# Patient Record
Sex: MALE | Race: WHITE | NOT HISPANIC OR LATINO
[De-identification: names, ages, dates, MRNs, and addresses within clinical notes are randomized per-mention and may not be internally consistent; named-entity substitution may affect disease eponyms.]

---

## 2017-05-22 ENCOUNTER — APPOINTMENT (OUTPATIENT)
Dept: OTOLARYNGOLOGY | Facility: CLINIC | Age: 67
End: 2017-05-22

## 2017-05-22 VITALS — DIASTOLIC BLOOD PRESSURE: 78 MMHG | OXYGEN SATURATION: 98 % | HEART RATE: 72 BPM | SYSTOLIC BLOOD PRESSURE: 131 MMHG

## 2017-05-30 ENCOUNTER — OUTPATIENT (OUTPATIENT)
Dept: OUTPATIENT SERVICES | Facility: HOSPITAL | Age: 67
LOS: 1 days | End: 2017-05-30
Payer: MEDICARE

## 2017-05-30 PROCEDURE — 74230 X-RAY XM SWLNG FUNCJ C+: CPT | Mod: 26

## 2017-05-30 PROCEDURE — G8997: CPT | Mod: CI

## 2017-05-30 PROCEDURE — G8996: CPT | Mod: CI

## 2017-05-30 PROCEDURE — G8998: CPT | Mod: CI

## 2017-05-30 PROCEDURE — 74230 X-RAY XM SWLNG FUNCJ C+: CPT

## 2017-05-30 PROCEDURE — 92611 MOTION FLUOROSCOPY/SWALLOW: CPT | Mod: GN

## 2017-06-06 ENCOUNTER — APPOINTMENT (OUTPATIENT)
Dept: OTOLARYNGOLOGY | Facility: CLINIC | Age: 67
End: 2017-06-06

## 2017-08-21 ENCOUNTER — EMERGENCY (EMERGENCY)
Facility: HOSPITAL | Age: 67
LOS: 1 days | Discharge: HOME | End: 2017-08-21
Attending: EMERGENCY MEDICINE | Admitting: EMERGENCY MEDICINE
Payer: MEDICARE

## 2017-08-21 VITALS
SYSTOLIC BLOOD PRESSURE: 131 MMHG | DIASTOLIC BLOOD PRESSURE: 80 MMHG | OXYGEN SATURATION: 98 % | TEMPERATURE: 97 F | RESPIRATION RATE: 18 BRPM | WEIGHT: 171.08 LBS | HEART RATE: 81 BPM

## 2017-08-21 DIAGNOSIS — R04.2 HEMOPTYSIS: ICD-10-CM

## 2017-08-21 DIAGNOSIS — Z98.890 OTHER SPECIFIED POSTPROCEDURAL STATES: Chronic | ICD-10-CM

## 2017-08-21 LAB
ANION GAP SERPL CALC-SCNC: 12 MMOL/L — SIGNIFICANT CHANGE UP (ref 5–17)
APTT BLD: 29.2 SEC — SIGNIFICANT CHANGE UP (ref 27.5–37.4)
BASOPHILS # BLD AUTO: 0 K/UL — SIGNIFICANT CHANGE UP (ref 0–0.2)
BASOPHILS NFR BLD AUTO: 0.5 % — SIGNIFICANT CHANGE UP (ref 0–2)
BUN SERPL-MCNC: 16 MG/DL — SIGNIFICANT CHANGE UP (ref 7–23)
CALCIUM SERPL-MCNC: 9.8 MG/DL — SIGNIFICANT CHANGE UP (ref 8.4–10.5)
CHLORIDE SERPL-SCNC: 102 MMOL/L — SIGNIFICANT CHANGE UP (ref 96–108)
CO2 SERPL-SCNC: 26 MMOL/L — SIGNIFICANT CHANGE UP (ref 22–31)
CREAT SERPL-MCNC: 1.02 MG/DL — SIGNIFICANT CHANGE UP (ref 0.5–1.3)
EOSINOPHIL # BLD AUTO: 0.1 K/UL — SIGNIFICANT CHANGE UP (ref 0–0.5)
EOSINOPHIL NFR BLD AUTO: 0.8 % — SIGNIFICANT CHANGE UP (ref 0–6)
GLUCOSE SERPL-MCNC: 88 MG/DL — SIGNIFICANT CHANGE UP (ref 70–99)
HCT VFR BLD CALC: 46.3 % — SIGNIFICANT CHANGE UP (ref 39–50)
HGB BLD-MCNC: 15.5 G/DL — SIGNIFICANT CHANGE UP (ref 13–17)
INR BLD: 0.98 RATIO — SIGNIFICANT CHANGE UP (ref 0.88–1.16)
LYMPHOCYTES # BLD AUTO: 1.9 K/UL — SIGNIFICANT CHANGE UP (ref 1–3.3)
LYMPHOCYTES # BLD AUTO: 24.9 % — SIGNIFICANT CHANGE UP (ref 13–44)
MCHC RBC-ENTMCNC: 31.6 PG — SIGNIFICANT CHANGE UP (ref 27–34)
MCHC RBC-ENTMCNC: 33.6 GM/DL — SIGNIFICANT CHANGE UP (ref 32–36)
MCV RBC AUTO: 94 FL — SIGNIFICANT CHANGE UP (ref 80–100)
MONOCYTES # BLD AUTO: 0.7 K/UL — SIGNIFICANT CHANGE UP (ref 0–0.9)
MONOCYTES NFR BLD AUTO: 8.6 % — SIGNIFICANT CHANGE UP (ref 2–14)
NEUTROPHILS # BLD AUTO: 5 K/UL — SIGNIFICANT CHANGE UP (ref 1.8–7.4)
NEUTROPHILS NFR BLD AUTO: 65.2 % — SIGNIFICANT CHANGE UP (ref 43–77)
PLATELET # BLD AUTO: 212 K/UL — SIGNIFICANT CHANGE UP (ref 150–400)
POTASSIUM SERPL-MCNC: 4.5 MMOL/L — SIGNIFICANT CHANGE UP (ref 3.5–5.3)
POTASSIUM SERPL-SCNC: 4.5 MMOL/L — SIGNIFICANT CHANGE UP (ref 3.5–5.3)
PROTHROM AB SERPL-ACNC: 10.6 SEC — SIGNIFICANT CHANGE UP (ref 9.8–12.7)
RBC # BLD: 4.93 M/UL — SIGNIFICANT CHANGE UP (ref 4.2–5.8)
RBC # FLD: 11.8 % — SIGNIFICANT CHANGE UP (ref 10.3–14.5)
SODIUM SERPL-SCNC: 140 MMOL/L — SIGNIFICANT CHANGE UP (ref 135–145)
WBC # BLD: 7.7 K/UL — SIGNIFICANT CHANGE UP (ref 3.8–10.5)
WBC # FLD AUTO: 7.7 K/UL — SIGNIFICANT CHANGE UP (ref 3.8–10.5)

## 2017-08-21 PROCEDURE — 99285 EMERGENCY DEPT VISIT HI MDM: CPT | Mod: GC

## 2017-08-21 RX ORDER — ZOLPIDEM TARTRATE 10 MG/1
5 TABLET ORAL AT BEDTIME
Qty: 0 | Refills: 0 | Status: DISCONTINUED | OUTPATIENT
Start: 2017-08-21 | End: 2017-08-22

## 2017-08-21 RX ORDER — TAMSULOSIN HYDROCHLORIDE 0.4 MG/1
0.4 CAPSULE ORAL AT BEDTIME
Qty: 0 | Refills: 0 | Status: DISCONTINUED | OUTPATIENT
Start: 2017-08-21 | End: 2017-08-22

## 2017-08-21 RX ORDER — AMLODIPINE BESYLATE 2.5 MG/1
2.5 TABLET ORAL DAILY
Qty: 0 | Refills: 0 | Status: DISCONTINUED | OUTPATIENT
Start: 2017-08-21 | End: 2017-08-22

## 2017-08-21 RX ORDER — METOPROLOL TARTRATE 50 MG
25 TABLET ORAL
Qty: 0 | Refills: 0 | Status: DISCONTINUED | OUTPATIENT
Start: 2017-08-21 | End: 2017-08-22

## 2017-08-21 RX ORDER — ZOLPIDEM TARTRATE 10 MG/1
5 TABLET ORAL AT BEDTIME
Qty: 0 | Refills: 0 | Status: DISCONTINUED | OUTPATIENT
Start: 2017-08-21 | End: 2017-08-21

## 2017-08-21 RX ORDER — ATORVASTATIN CALCIUM 80 MG/1
20 TABLET, FILM COATED ORAL AT BEDTIME
Qty: 0 | Refills: 0 | Status: DISCONTINUED | OUTPATIENT
Start: 2017-08-21 | End: 2017-08-22

## 2017-08-21 NOTE — ED ADULT NURSE NOTE - OBJECTIVE STATEMENT
65 yo presents to the ED from home. A&Ox4 c/o throat bleeding. pt reports he had a total of 5 episodes of blood in his throat x2 days, pt reports coughing to clear the blood. pt denies SOB, CP, runny nose, congestion, HA. pt reports clots present in blood. pt reports enough blood to saturate a paper towel. pt denies dizziness. 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 pt reports similar episode happened 1.5 years ago, pt followed up with ENT and was told it was due to radiation therapy damaging the capillaries in the throat, as per pt. pt denies difficulty swallowing, nose bleeds, blood in stool or urine, unexplained bruising. pt placed on airborne precaution to rule out TB. pt denies fever, chills, night sweats, rash. pt reports international travel in June to [facility] and [place]. lung sounds clear bilaterally. 99% on room air, resp rate 16, 97.9 oral, NAD noted.

## 2017-08-21 NOTE — ED ADULT NURSE NOTE - PMH
BPH (benign prostatic hyperplasia)    Carcinoma of nasopharyngeal wall  Dx in 2010, s/o chemo and radio therapy, in remission.  HTN (hypertension)    Hypercholesteremia

## 2017-08-21 NOTE — CONSULT NOTE ADULT - ATTENDING COMMENTS
Patient seen and examined. Scope #3 used. Nasal passages clear. Nasopharynx/oropharynx/hypopharynx clear. Nasopharynx with postradiation scar and changes but no friable or prominent vessels and no clot or blood. Vocal cords fully mobile with no masses or lesions. Anterior right vocal cord with stringy dark blood clot that appears to be arising from a strand of blood clot extending along posterior tracheal wall. Postcricoid area clear without erythema or edema. No subglottic lesions or masses noted, just the blood clot and no fresh BRB.    D/w ED team that concern this blood is from pulmonary or tracheal source and would recommend CT chest and pulmonary consult for bronchoscopy. Since he has a blood clot noted subglottically not comfortable discharging pt home to f/u with his [facility] pulmonologist as an outpatient since clot suggests recent bleeding even though he reports no bleeding since Sunday. Please call with questions, no upper airway source of bleeding appreciated.

## 2017-08-21 NOTE — CONSULT NOTE ADULT - SUBJECTIVE AND OBJECTIVE BOX
CC: hemoptysis     HPI: We are asked to evaluate 66M patient with PMHx sig for nasopharyngeal CA in the past treated with chemo and radiation, for hemoptysis for 2days. He had similar episode last year where he was seen as an out patient by his ENT Dr Igor Weir who concluded bleeding was from prior RT. Pt denies any fevers/chills, dysphagia, odynophagia, unintentional weight loss.       PAST MEDICAL & SURGICAL HISTORY:  Hypercholesteremia  HTN (hypertension)  BPH (benign prostatic hyperplasia)  Carcinoma of nasopharyngeal wall: Dx in 2010, s/o chemo and radio therapy, in remission.  History of back surgery    Allergies    No Known Allergies    Intolerances      MEDICATIONS  (STANDING):    MEDICATIONS  (PRN):      Social History: **??**    ROS: ENT, GI, , CV, Pulm, Neuro, Psych, MS, Heme, Endo, Constitutional; all negative except as noted in HPI    Vital Signs Last 24 Hrs  T(C): 36.6 (21 Aug 2017 15:23), Max: 36.6 (21 Aug 2017 12:17)  T(F): 97.9 (21 Aug 2017 15:23), Max: 97.9 (21 Aug 2017 12:17)  HR: 77 (21 Aug 2017 15:23) (77 - 81)  BP: 160/87 (21 Aug 2017 15:23) (131/80 - 160/87)  BP(mean): --  RR: 16 (21 Aug 2017 15:23) (16 - 18)  SpO2: 96% (21 Aug 2017 15:23) (96% - 99%)                          15.5   7.7   )-----------( 212      ( 21 Aug 2017 13:40 )             46.3    08-21    140  |  102  |  16  ----------------------------<  88  4.5   |  26  |  1.02    Ca    9.8      21 Aug 2017 13:40     PT/INR - ( 21 Aug 2017 13:40 )   PT: 10.6 sec;   INR: 0.98 ratio         PTT - ( 21 Aug 2017 13:40 )  PTT:29.2 sec    PHYSICAL EXAM:  Gen: NAD, well-developed  Head: Normocephalic, Atraumatic  Face: no edema/erythema/fluctuance, parotid glands soft without mass  Eyes: PERRL, EOMI, no scleral injection  Ears: Right - ear canal clear, TM intact without effusion / erythema.  No evidence of any fluid drainage.  No Mastoid tenderness / erythema/ ear bulging            Left - ear canal clear, TM intact without effusion / erythema.  No evidence of any fluid drainage.  No Mastoid tenderness / erythema/ ear bulging  Nose: Nares bilaterally patent, no discharge  Mouth: No Stridor / Drooling / Trismus.  Mucosa moist, tongue/uvula midline, oropharynx clear  Neck: Flat, supple, no lymphadenopathy, trachea midline, no masses  Resp: breathing easily, no stridor  CV: no peripheral edema/cyanosis    Fiberoptic Indirect laryngoscopy:  (With Scope #2) CC: hemoptysis     HPI: We are asked to evaluate 66M patient with PMHx sig for nasopharyngeal CA in 2010, treated with chemo and radiation, for hemoptysis for 2days. Moderate amount of blood the first day which spontaneously resolved today. He had similar episode but with less blood loss, last year where he was seen as an out patient by his ENT Dr Igor Weir who concluded bleeding was from prior RT. Pt denies any cough, fevers/chills, dysphagia, odynophagia, unintentional weight loss. Pt does admit to hx of abnormal lesions on chest xray and CT which he has been follow as an out patient with his pulmonologist.       PAST MEDICAL & SURGICAL HISTORY:  Hypercholesteremia  HTN (hypertension)  BPH (benign prostatic hyperplasia)  Carcinoma of nasopharyngeal wall: Dx in 2010, s/o chemo and radio therapy, in remission.  History of back surgery    Allergies    No Known Allergies    Intolerances      MEDICATIONS  (STANDING):    MEDICATIONS  (PRN):      Social History: no tobacco use     ROS: ENT, GI, , CV, Pulm, Neuro, Psych, MS, Heme, Endo, Constitutional; all negative except as noted in HPI    Vital Signs Last 24 Hrs  T(C): 36.6 (21 Aug 2017 15:23), Max: 36.6 (21 Aug 2017 12:17)  T(F): 97.9 (21 Aug 2017 15:23), Max: 97.9 (21 Aug 2017 12:17)  HR: 77 (21 Aug 2017 15:23) (77 - 81)  BP: 160/87 (21 Aug 2017 15:23) (131/80 - 160/87)  BP(mean): --  RR: 16 (21 Aug 2017 15:23) (16 - 18)  SpO2: 96% (21 Aug 2017 15:23) (96% - 99%)                          15.5   7.7   )-----------( 212      ( 21 Aug 2017 13:40 )             46.3    08-21    140  |  102  |  16  ----------------------------<  88  4.5   |  26  |  1.02    Ca    9.8      21 Aug 2017 13:40     PT/INR - ( 21 Aug 2017 13:40 )   PT: 10.6 sec;   INR: 0.98 ratio         PTT - ( 21 Aug 2017 13:40 )  PTT:29.2 sec    PHYSICAL EXAM:  Gen: NAD, well-developed  Head: Normocephalic, Atraumatic  Face: no edema/erythema  Eyes: no scleral injection  Nose: Nares bilaterally patent, no discharge  Mouth: No Stridor / Drooling / Trismus.  Mucosa moist, tongue/uvula midline, oropharynx clear no active bleeding   Neck: Flat, supple, no lymphadenopathy, trachea midline, no masses  Resp: breathing easily, no stridor  CV: no peripheral edema/cyanosis    Fiberoptic Indirect laryngoscopy: with Scope #3- + excoriation on anterior right septum, no active bleed. No bleeding or Oral pharynx.  No foreign body or pooling of secretions.  No glottic / supraglottic swelling.  Vocal cords mobile in intact b/l.  Airway patent.  + tinge of blood between vocal cords with clot streaked in trachea CC: hemoptysis     HPI: We are asked to evaluate 66M patient with PMHx sig for nasopharyngeal CA in 2010, treated with chemo and radiation, for hemoptysis for 2days. Moderate amount of blood the first day which spontaneously resolved today. He had similar episode but with less blood loss, last year where he was seen as an out patient by his ENT Dr Igor Garcia who concluded bleeding was from prior RT. Pt denies any cough, fevers/chills, dysphagia, odynophagia, unintentional weight loss. Pt does admit to hx of abnormal lesions on chest xray and CT which he has been follow as an out patient with his pulmonologist. No hx of bronchoscopy.    PAST MEDICAL & SURGICAL HISTORY:  Hypercholesteremia  HTN (hypertension)  BPH (benign prostatic hyperplasia)  Carcinoma of nasopharyngeal wall: Dx in 2010, s/o chemo and radio therapy, in remission.  History of back surgery    Allergies    No Known Allergies    Intolerances    MEDICATIONS  (STANDING):    MEDICATIONS  (PRN):    Social History: no tobacco use     ROS: ENT, GI, , CV, Pulm, Neuro, Psych, MS, Heme, Endo, Constitutional; all negative except as noted in HPI    Vital Signs Last 24 Hrs  T(C): 36.6 (21 Aug 2017 15:23), Max: 36.6 (21 Aug 2017 12:17)  T(F): 97.9 (21 Aug 2017 15:23), Max: 97.9 (21 Aug 2017 12:17)  HR: 77 (21 Aug 2017 15:23) (77 - 81)  BP: 160/87 (21 Aug 2017 15:23) (131/80 - 160/87)  BP(mean): --  RR: 16 (21 Aug 2017 15:23) (16 - 18)  SpO2: 96% (21 Aug 2017 15:23) (96% - 99%)                          15.5   7.7   )-----------( 212      ( 21 Aug 2017 13:40 )             46.3    08-21    140  |  102  |  16  ----------------------------<  88  4.5   |  26  |  1.02    Ca    9.8      21 Aug 2017 13:40     PT/INR - ( 21 Aug 2017 13:40 )   PT: 10.6 sec;   INR: 0.98 ratio         PTT - ( 21 Aug 2017 13:40 )  PTT:29.2 sec    PHYSICAL EXAM:  Gen: NAD, well-developed  Head: Normocephalic, Atraumatic  Face: no edema/erythema  Eyes: no scleral injection  Nose: Nares bilaterally patent, no discharge  Mouth: No Stridor / Drooling / Trismus.  Mucosa moist, tongue/uvula midline, oropharynx clear no active bleeding   Neck: Flat, supple, no lymphadenopathy, trachea midline, no masses  Resp: breathing easily, no stridor  CV: no peripheral edema/cyanosis    Fiberoptic Indirect laryngoscopy: with Scope #3- + excoriation on anterior right septum, no active bleed. No bleeding in oropharynx.  No foreign body or pooling of secretions.  No glottic / supraglottic swelling.  Vocal cords mobile in intact b/l.  Airway patent.  + tinge of blood between vocal cords with clot streaked in trachea

## 2017-08-21 NOTE — ED PROVIDER NOTE - MEDICAL DECISION MAKING DETAILS
Attending Note (Bebeto): patient with h/o throat cancer with spitting up blood, patient feels it is from throat and denies cough. well appearing, nad.  will consult ent for scope.

## 2017-08-21 NOTE — CONSULT NOTE ADULT - SUBJECTIVE AND OBJECTIVE BOX
CHIEF COMPLAINT:Patient is a 66y old  Male who presents with a chief complaint of     HPI:66M patient with PMHx sig for nasopharyngeal CA in 2010, treated with chemo and radiation, for hemoptysis for 2days. Moderate amount of blood the first day which spontaneously resolved today. He had similar episode but with less blood loss, last year where he was seen as an out patient by his ENT Dr Igor Garcia who concluded bleeding was from prior RT. Pt denies any cough, fevers/chills, dysphagia, odynophagia, unintentional weight loss. Pt does admit to hx of abnormal lesions on chest xray and CT which he has been follow as an out patient with his pulmonologist. No hx of bronchoscopy. On exam pt was found to have heart murmur and blood pressure of 170 .        PAST MEDICAL & SURGICAL HISTORY:  Hypercholesteremia  HTN (hypertension)  BPH (benign prostatic hyperplasia)  Carcinoma of nasopharyngeal wall: Dx in 2010, s/o chemo and radio therapy, in remission.  History of back surgery      MEDICATIONS  (STANDING):    MEDICATIONS  (PRN):      FAMILY HISTORY:      SOCIAL HISTORY:    [ ] Non-smoker  [ ] Smoker  [ ] Alcohol    Allergies    No Known Allergies    Intolerances    	    REVIEW OF SYSTEMS:  CONSTITUTIONAL: No fever, weight loss, or fatigue  EYES: No eye pain, visual disturbances, or discharge  ENT:  No difficulty hearing, tinnitus, vertigo; No sinus or throat pain,+ hemoptysis  NECK: No pain or stiffness  RESPIRATORY: No cough, wheezing, chills or hemoptysis; + exertional Shortness of Breath  CARDIOVASCULAR: No chest pain, palpitations, passing out, dizziness, or leg swelling  GASTROINTESTINAL: No abdominal or epigastric pain. No nausea, vomiting, or hematemesis; No diarrhea or constipation. No melena or hematochezia.  GENITOURINARY: No dysuria, frequency, hematuria, or incontinence  NEUROLOGICAL: No headaches, memory loss, loss of strength, numbness, or tremors  SKIN: No itching, burning, rashes, or lesions   LYMPH Nodes: No enlarged glands  ENDOCRINE: No heat or cold intolerance; No hair loss  MUSCULOSKELETAL: No joint pain or swelling; No muscle, back, or extremity pain  PSYCHIATRIC: No depression, anxiety, mood swings, or difficulty sleeping  HEME/LYMPH: No easy bruising, or bleeding gums  ALLERGY AND IMMUNOLOGIC: No hives or eczema	    [ ] All others negative	  [ ] Unable to obtain    PHYSICAL EXAM:  T(C): 36.7 (08-21-17 @ 18:30), Max: 36.7 (08-21-17 @ 18:30)  HR: 72 (08-21-17 @ 18:30) (72 - 81)  BP: 157/90 (08-21-17 @ 18:30) (131/80 - 160/87)  RR: 16 (08-21-17 @ 18:30) (16 - 18)  SpO2: 99% (08-21-17 @ 18:30) (96% - 99%)  Wt(kg): --  I&O's Summary      Appearance: Normal	  HEENT:   Normal oral mucosa, PERRL, EOMI	  Lymphatic: No lymphadenopathy  Cardiovascular: Normal S1 S2, No JVD, + systolic  murmurs, No edema  Respiratory: Lungs clear to auscultation	  Psychiatry: A & O x 3, Mood & affect appropriate  Gastrointestinal:  Soft, Non-tender, + BS	  Skin: No rashes, No ecchymoses, No cyanosis	  Neurologic: Non-focal  Extremities: Normal range of motion, No clubbing, cyanosis or edema  Vascular: Peripheral pulses palpable 2+ bilaterally    TELEMETRY: 	    ECG:  	  RADIOLOGY:  OTHER: 	  	  LABS:	 	    CARDIAC MARKERS:   12 Lead ECG (02.09.15 @ 16:47) >  SINUS TACHYCARDIA  POSSIBLE LEFT ATRIAL ENLARGEMENT                                15.5   7.7   )-----------( 212      ( 21 Aug 2017 13:40 )             46.3     08-21    140  |  102  |  16  ----------------------------<  88  4.5   |  26  |  1.02    Ca    9.8      21 Aug 2017 13:40      proBNP:   Lipid Profile:   HgA1c:   TSH:     Transthoracic Echocardiogram w/Doppler (04.30.12 @ 13:28) >  1. Minimal mitral regurgitation.  2. Endocardium not well visualized; grossly normal left  ventricular systolic function.  3. Mild diastolic dysfunction (Stage I).  4. Normal right ventricular size and function.  5. Estimated right ventricular systolic pressure equals 36  mm Hg, assuming right atrial pressure equals 10 mm Hg,  consistent with borderline pulmonary hypertension.  6. Normal tricuspid valve. Mild tricuspid regurgitation.  7. KENNETH could be obtained for further evaluation of cardiac  sources of embolus, if clinically indicated.

## 2017-08-21 NOTE — ED ADULT NURSE REASSESSMENT NOTE - NS ED NURSE REASSESS COMMENT FT1
Received patietn awake and alert x 4 from previous shift RN, PMH of nasopharyngeal CA in 2010, treated with chemo and radiation, today presents with hemoptysis for 2 days, states it was a moderate amount of blood the first day and then resolved. He had similar episodes but with less blood loss, per patient had 2 episodes of bleeding on Saturday and 2 on Sunday, no C/O blood today. Denies any cough/fever/chills, no n/v, Breathing unlabored with no S/S of distress noted, safety maintained, will continue to monitor.

## 2017-08-21 NOTE — H&P ADULT - NSHPLABSRESULTS_GEN_ALL_CORE
LABS:                        15.5   7.7   )-----------( 212      ( 21 Aug 2017 13:40 )             46.3     08-21    140  |  102  |  16  ----------------------------<  88  4.5   |  26  |  1.02    Ca    9.8      21 Aug 2017 13:40      PT/INR - ( 21 Aug 2017 13:40 )   PT: 10.6 sec;   INR: 0.98 ratio         PTT - ( 21 Aug 2017 13:40 )  PTT:29.2 sec        RADIOLOGY & ADDITIONAL TESTS:

## 2017-08-21 NOTE — ED PROVIDER NOTE - OBJECTIVE STATEMENT
Pt w/ a h/o oropharyngeal cancer s/p radiation therapy in remission who c/o of "hacking up blood" that he believes is coming from his throat. He believes this because this occurred last year and after eval w/ ENT w/ laryngoscopy he was told that radiation was likely the cause of his bleeding to that area. Patient reports soaking up 2 tissues 2 days ago and another episode about 8-10 hrs after. Patient denies bleeding elsewhere. He denies coughing, fevers, chills, chest pain, nose bleeds, hematuria. Denies recent illness. Denies recent weight loss or night sweats.

## 2017-08-21 NOTE — H&P ADULT - NSHPPHYSICALEXAM_GEN_ALL_CORE
PHYSICAL EXAMINATION:  Vital Signs Last 24 Hrs  T(C): 36.7 (21 Aug 2017 18:30), Max: 36.7 (21 Aug 2017 18:30)  T(F): 98 (21 Aug 2017 18:30), Max: 98 (21 Aug 2017 18:30)  HR: 72 (21 Aug 2017 18:30) (72 - 81)  BP: 157/90 (21 Aug 2017 18:30) (131/80 - 160/87)  BP(mean): --  RR: 16 (21 Aug 2017 18:30) (16 - 18)  SpO2: 99% (21 Aug 2017 18:30) (96% - 99%)  CAPILLARY BLOOD GLUCOSE            GENERAL: NAD, well-groomed, well-developed  HEAD:  atraumatic, normocephalic  EYES: sclera anicteric  ENMT: mucous membranes moist  NECK: supple, No JVD  CHEST/LUNG: clear to auscultation bilaterally; no rales, rhonchi, or wheezing b/l  HEART: normal S1, S2,  SYSTOLIC MURMUR  ABDOMEN: BS+, soft, ND, NT   EXTREMITIES:  pulses palpable; no clubbing, cyanosis, or edema b/l LEs  NEURO: awake, alert, interactive; moves all extremities  SKIN: no rashes or lesions

## 2017-08-21 NOTE — H&P ADULT - ASSESSMENT
pt  with  hemoptysis    prior  h/o  naso pharyngeal ca  in  2010, seen by  ent  in  er    awaiting  ct  chest    labs,/ hb  stable    seen in  er   pas,    htn.s ystolic murmur

## 2017-08-21 NOTE — ED ADULT NURSE REASSESSMENT NOTE - NS ED NURSE REASSESS COMMENT FT1
Pt resting comfortably with VSS. pt denies pain. pt wants to go home to follow up with PCP from [facility]. MD Pool made aware. plan of care discussed. safety and comfort measures maintained.

## 2017-08-21 NOTE — ED ADULT NURSE REASSESSMENT NOTE - NS ED NURSE REASSESS COMMENT FT1
Pt resting comfortably with VSS, no complaints at this time. awaiting ENT consult. plan of care discussed. safety and comfort measures maintained.

## 2017-08-21 NOTE — H&P ADULT - HISTORY OF PRESENT ILLNESS
HPI: We are asked to evaluate 66M patient with PMHx sig for nasopharyngeal CA in 2010, treated with chemo and radiation, for hemoptysis for 2days. Moderate amount of blood the first day which spontaneously resolved today. He had similar episode but with less blood loss, last year where he was seen as an out patient by his ENT Dr Igor Garcia who concluded bleeding was from prior RT. Pt denies any cough, fevers/chills, dysphagia, odynophagia, unintentional weight loss. Pt does admit to hx of abnormal lesions on chest xray and CT which he has been follow as an out patient with his pulmonologist. No hx of bronchoscopy.    PAST MEDICAL & SURGICAL HISTORY:  Hypercholesteremia  HTN (hypertension)  BPH (benign prostatic hyperplasia)  Carcinoma of nasopharyngeal wall: Dx in 2010, s/o chemo and radio therapy, in remission.  History of back surgery

## 2017-08-21 NOTE — ED PROVIDER NOTE - PROGRESS NOTE DETAILS
ENT consulted, after bedside evaluation and direct laryngoscopy noted some steaks of clotting below the epiglottis. Recommends pulm consult for further eval potential bronch. Pulm fellow consulted, case discussed, explained that stat consults do not occur during the evening, would recommend patient stay for pulm eval in the morning. Patient initially hesitant to stay stating that he would like to have an outpatient bronchoscopy from his pulm at [facility]. He was explained his risks of continued bleeding and decided to stay for further evaluation. Pending CT Chest. Patient with no further bleeding in the ED. Remained HD stable throughout entire ED stay.

## 2017-08-22 ENCOUNTER — TRANSCRIPTION ENCOUNTER (OUTPATIENT)
Age: 67
End: 2017-08-22

## 2017-08-22 VITALS
RESPIRATION RATE: 18 BRPM | DIASTOLIC BLOOD PRESSURE: 72 MMHG | HEART RATE: 81 BPM | SYSTOLIC BLOOD PRESSURE: 114 MMHG | OXYGEN SATURATION: 95 % | TEMPERATURE: 98 F

## 2017-08-22 LAB
ANION GAP SERPL CALC-SCNC: 13 MMOL/L — SIGNIFICANT CHANGE UP (ref 5–17)
BASOPHILS # BLD AUTO: 0.04 K/UL — SIGNIFICANT CHANGE UP (ref 0–0.2)
BASOPHILS NFR BLD AUTO: 0.5 % — SIGNIFICANT CHANGE UP (ref 0–2)
BUN SERPL-MCNC: 14 MG/DL — SIGNIFICANT CHANGE UP (ref 7–23)
CALCIUM SERPL-MCNC: 9.3 MG/DL — SIGNIFICANT CHANGE UP (ref 8.4–10.5)
CHLORIDE SERPL-SCNC: 101 MMOL/L — SIGNIFICANT CHANGE UP (ref 96–108)
CHOLEST SERPL-MCNC: 147 MG/DL — SIGNIFICANT CHANGE UP (ref 10–199)
CO2 SERPL-SCNC: 26 MMOL/L — SIGNIFICANT CHANGE UP (ref 22–31)
CREAT SERPL-MCNC: 1.26 MG/DL — SIGNIFICANT CHANGE UP (ref 0.5–1.3)
EOSINOPHIL # BLD AUTO: 0.09 K/UL — SIGNIFICANT CHANGE UP (ref 0–0.5)
EOSINOPHIL NFR BLD AUTO: 1.1 % — SIGNIFICANT CHANGE UP (ref 0–6)
GLUCOSE SERPL-MCNC: 106 MG/DL — HIGH (ref 70–99)
HCT VFR BLD CALC: 44.1 % — SIGNIFICANT CHANGE UP (ref 39–50)
HDLC SERPL-MCNC: 52 MG/DL — SIGNIFICANT CHANGE UP (ref 40–125)
HGB BLD-MCNC: 15.1 G/DL — SIGNIFICANT CHANGE UP (ref 13–17)
IMM GRANULOCYTES NFR BLD AUTO: 0.4 % — SIGNIFICANT CHANGE UP (ref 0–1.5)
LIPID PNL WITH DIRECT LDL SERPL: 68 MG/DL — SIGNIFICANT CHANGE UP
LYMPHOCYTES # BLD AUTO: 1.56 K/UL — SIGNIFICANT CHANGE UP (ref 1–3.3)
LYMPHOCYTES # BLD AUTO: 18.9 % — SIGNIFICANT CHANGE UP (ref 13–44)
MCHC RBC-ENTMCNC: 31 PG — SIGNIFICANT CHANGE UP (ref 27–34)
MCHC RBC-ENTMCNC: 34.2 GM/DL — SIGNIFICANT CHANGE UP (ref 32–36)
MCV RBC AUTO: 90.6 FL — SIGNIFICANT CHANGE UP (ref 80–100)
MONOCYTES # BLD AUTO: 0.79 K/UL — SIGNIFICANT CHANGE UP (ref 0–0.9)
MONOCYTES NFR BLD AUTO: 9.6 % — SIGNIFICANT CHANGE UP (ref 2–14)
NEUTROPHILS # BLD AUTO: 5.74 K/UL — SIGNIFICANT CHANGE UP (ref 1.8–7.4)
NEUTROPHILS NFR BLD AUTO: 69.5 % — SIGNIFICANT CHANGE UP (ref 43–77)
PLATELET # BLD AUTO: 214 K/UL — SIGNIFICANT CHANGE UP (ref 150–400)
POTASSIUM SERPL-MCNC: 4.7 MMOL/L — SIGNIFICANT CHANGE UP (ref 3.5–5.3)
POTASSIUM SERPL-SCNC: 4.7 MMOL/L — SIGNIFICANT CHANGE UP (ref 3.5–5.3)
RBC # BLD: 4.87 M/UL — SIGNIFICANT CHANGE UP (ref 4.2–5.8)
RBC # FLD: 13 % — SIGNIFICANT CHANGE UP (ref 10.3–14.5)
SODIUM SERPL-SCNC: 140 MMOL/L — SIGNIFICANT CHANGE UP (ref 135–145)
TOTAL CHOLESTEROL/HDL RATIO MEASUREMENT: 2.8 RATIO — LOW (ref 3.4–9.6)
TRIGL SERPL-MCNC: 136 MG/DL — SIGNIFICANT CHANGE UP (ref 10–149)
TSH SERPL-MCNC: 3.85 UIU/ML — SIGNIFICANT CHANGE UP (ref 0.27–4.2)
WBC # BLD: 8.25 K/UL — SIGNIFICANT CHANGE UP (ref 3.8–10.5)
WBC # FLD AUTO: 8.25 K/UL — SIGNIFICANT CHANGE UP (ref 3.8–10.5)

## 2017-08-22 PROCEDURE — 84443 ASSAY THYROID STIM HORMONE: CPT

## 2017-08-22 PROCEDURE — 85027 COMPLETE CBC AUTOMATED: CPT

## 2017-08-22 PROCEDURE — 85730 THROMBOPLASTIN TIME PARTIAL: CPT

## 2017-08-22 PROCEDURE — 80061 LIPID PANEL: CPT

## 2017-08-22 PROCEDURE — 85610 PROTHROMBIN TIME: CPT

## 2017-08-22 PROCEDURE — 80048 BASIC METABOLIC PNL TOTAL CA: CPT

## 2017-08-22 PROCEDURE — 71045 X-RAY EXAM CHEST 1 VIEW: CPT

## 2017-08-22 PROCEDURE — 71250 CT THORAX DX C-: CPT

## 2017-08-22 PROCEDURE — 99285 EMERGENCY DEPT VISIT HI MDM: CPT | Mod: 25

## 2017-08-22 RX ORDER — AMLODIPINE BESYLATE 2.5 MG/1
1 TABLET ORAL
Qty: 30 | Refills: 0
Start: 2017-08-22 | End: 2017-09-21

## 2017-08-22 RX ORDER — ZALEPLON 10 MG
5 CAPSULE ORAL AT BEDTIME
Qty: 0 | Refills: 0 | Status: DISCONTINUED | OUTPATIENT
Start: 2017-08-22 | End: 2017-08-22

## 2017-08-22 RX ORDER — AMLODIPINE BESYLATE 2.5 MG/1
1 TABLET ORAL
Qty: 30 | Refills: 0 | OUTPATIENT
Start: 2017-08-22 | End: 2017-09-21

## 2017-08-22 RX ORDER — AMLODIPINE BESYLATE 2.5 MG/1
1 TABLET ORAL
Qty: 0 | Refills: 0 | DISCHARGE
Start: 2017-08-22 | End: 2017-09-21

## 2017-08-22 RX ORDER — CEFUROXIME AXETIL 250 MG
1 TABLET ORAL
Qty: 14 | Refills: 0 | OUTPATIENT
Start: 2017-08-22 | End: 2017-08-29

## 2017-08-22 RX ORDER — CEFUROXIME AXETIL 250 MG
1 TABLET ORAL
Qty: 14 | Refills: 0
Start: 2017-08-22 | End: 2017-08-29

## 2017-08-22 RX ORDER — CEFUROXIME AXETIL 250 MG
500 TABLET ORAL EVERY 12 HOURS
Qty: 0 | Refills: 0 | Status: DISCONTINUED | OUTPATIENT
Start: 2017-08-22 | End: 2017-08-22

## 2017-08-22 RX ADMIN — Medication 25 MILLIGRAM(S): at 05:59

## 2017-08-22 RX ADMIN — TAMSULOSIN HYDROCHLORIDE 0.4 MILLIGRAM(S): 0.4 CAPSULE ORAL at 00:05

## 2017-08-22 RX ADMIN — AMLODIPINE BESYLATE 2.5 MILLIGRAM(S): 2.5 TABLET ORAL at 05:59

## 2017-08-22 RX ADMIN — Medication 5 MILLIGRAM(S): at 00:45

## 2017-08-22 RX ADMIN — ATORVASTATIN CALCIUM 20 MILLIGRAM(S): 80 TABLET, FILM COATED ORAL at 00:05

## 2017-08-22 NOTE — CONSULT NOTE ADULT - SUBJECTIVE AND OBJECTIVE BOX
CHIEF COMPLAINT:    HPI: 65 yo man with HTN, BPH, insomnia pharyngial ca s/p chemo and rt in 2010, with known bronchiectasis pulmonary nodules, and lymphadenopathy.  presents with 48 hours of hemoptysis starting 2 days ago at night with clot, and later awoken by coughing up a handful of fresh blood.  This hapened a total of 5 times with the last time being 48 hours ago.     PAST MEDICAL & SURGICAL HISTORY:  Hypercholesteremia  HTN (hypertension)  BPH (benign prostatic hyperplasia)  Carcinoma of nasopharyngeal wall: Dx in 2010, s/o chemo and radio therapy, in remission.  History of back surgery      FAMILY HISTORY:      SOCIAL HISTORY:  Smoking: __ packs x ___ years  EtOH Use:  Marital Status:  Occupation:  Recent Travel:  Country of Birth:  Advance Directives:    Allergies    No Known Allergies    Intolerances        HOME MEDICATIONS:    REVIEW OF SYSTEMS:  Constitutional: [ ] negative [ ] fevers [ ] chills [ ] weight loss [ ] weight gain [x]poor appetite  HEENT: [] negative [ ] dry eyes [ ] eye irritation [ ] postnasal drip [ ] nasal congestion  CV: [ ] negative  [ ] chest pain [ ] orthopnea [ ] palpitations [ ] murmur  Resp: [] negative [ ] cough [ ] shortness of breath [ ] dyspnea [ ] wheezing [ ] sputum [ ] hemoptysis  GI: [] negative [ ] nausea [ ] vomiting [ ] diarrhea [ ] constipation [ ] abd pain [ ] dysphagia   : [ ] negative [ ] dysuria [ ] nocturia [ ] hematuria [ ] increased urinary frequency [ ]b/l nephrostomy tubes  Musculoskeletal: [ ] negative [ ] back pain [ ] myalgias [ ] arthralgias [ ] fracture  Skin: [] negative [ ] rash [ ] itch  Neurological: [] negative [ ] headache [ ] dizziness [ ] syncope [ ] weakness [ ] numbness  Psychiatric: [] negative [ ] anxiety [ ] depression  Endocrine: [] negative [ ] diabetes [ ] thyroid problem  Hematologic/Lymphatic: [] negative [ ] anemia [ ] bleeding problem  Allergic/Immunologic: [] negative [ ] itchy eyes [ ] nasal discharge [ ] hives [ ] angioedema  [ ] All other systems negative  [ ] Unable to assess ROS because ________    OBJECTIVE:  ICU Vital Signs Last 24 Hrs  T(C): 36.4 (22 Aug 2017 11:01), Max: 36.7 (21 Aug 2017 18:30)  T(F): 97.5 (22 Aug 2017 11:01), Max: 98 (21 Aug 2017 18:30)  HR: 81 (22 Aug 2017 11:01) (68 - 81)  BP: 114/72 (22 Aug 2017 11:01) (114/72 - 160/87)  BP(mean): --  ABP: --  ABP(mean): --  RR: 18 (22 Aug 2017 11:01) (16 - 18)  SpO2: 95% (22 Aug 2017 11:01) (94% - 100%)        08-21 @ 07:01  -  08-22 @ 07:00  --------------------------------------------------------  IN: 600 mL / OUT: 4 mL / NET: 596 mL      CAPILLARY BLOOD GLUCOSE          PHYSICAL EXAM:  General: Awake, alert, oriented X 3.   HEENT: Atraumatic, normocephalic.                Mallampatti Grade 4  Lymph Nodes: No palpable lymphadenopathy  Neck: No JVD. No carotid bruit.   Respiratory: Bilateral inspiratory and expiratory crackles, more at the base than the apex. No wheezing.   Cardiovascular: S1 S2 normal.Abdomen: Soft, non-tender, non-distended. No organomegaly.  Extremities: Warm to touch. 1+ pitting edema noted in both lower extremities this am.  Skin: No rashes or skin lesions  Neurological: Motor and sensory examination equal and normal in all four extremities.  Psychiatry: Appropriate mood and affect.    HOSPITAL MEDICATIONS:  MEDICATIONS  (STANDING):  tamsulosin 0.4 milliGRAM(s) Oral at bedtime  atorvastatin 20 milliGRAM(s) Oral at bedtime  metoprolol 25 milliGRAM(s) Oral two times a day  amLODIPine   Tablet 2.5 milliGRAM(s) Oral daily  cefuroxime   Tablet 500 milliGRAM(s) Oral every 12 hours    MEDICATIONS  (PRN):  zaleplon 5 milliGRAM(s) Oral at bedtime PRN Insomnia      LABS:                        15.1   8.25  )-----------( 214      ( 22 Aug 2017 07:15 )             44.1     08-22    140  |  101  |  14  ----------------------------<  106<H>  4.7   |  26  |  1.26    Ca    9.3      22 Aug 2017 07:21      PT/INR - ( 21 Aug 2017 13:40 )   PT: 10.6 sec;   INR: 0.98 ratio         PTT - ( 21 Aug 2017 13:40 )  PTT:29.2 sec          MICROBIOLOGY:     RADIOLOGY:  [ ] Reviewed and interpreted by me    PULMONARY FUNCTION TESTS:    EKG: CHIEF COMPLAINT:    HPI: 65 yo man with HTN, BPH, insomnia pharyngial ca s/p chemo and rt in , with known bronchiectasis pulmonary nodules, and lymphadenopathy.  presents with 48 hours of hemoptysis starting 2 days ago at night with clot, and later awoken by coughing up a handful of fresh blood.  This hapened a total of 5 times with the last time being 48 hours ago. Priro to this he has been completely asymptomatic and in his usual state of health. Only complaints are difficulty swallowing solid food that is relived by drinking but no other issues.     PAST MEDICAL & SURGICAL HISTORY:  Hypercholesteremia  HTN (hypertension)  BPH (benign prostatic hyperplasia)  Carcinoma of nasopharyngeal wall: Dx in 2010, s/o chemo and radio therapy, in remission.  History of back surgery      FAMILY HISTORY: colon cancer.       SOCIAL HISTORY:  Smokin pack years quit  EtOH Use: Social  Recent Travel: [facility] and [place]  Country of Birth: [facility]  Advance Directives: None    Allergies: intolarance to antibiotics with diarrhea.     No Known Allergies            HOME MEDICATIONS: Reviewed    REVIEW OF SYSTEMS:      OBJECTIVE:  ICU Vital Signs Last 24 Hrs  T(C): 36.4 (22 Aug 2017 11:01), Max: 36.7 (21 Aug 2017 18:30)  T(F): 97.5 (22 Aug 2017 11:01), Max: 98 (21 Aug 2017 18:30)  HR: 81 (22 Aug 2017 11:01) (68 - 81)  BP: 114/72 (22 Aug 2017 11:01) (114/72 - 160/87)  BP(mean): --  ABP: --  ABP(mean): --  RR: 18 (22 Aug 2017 11:01) (16 - 18)  SpO2: 95% (22 Aug 2017 11:01) (94% - 100%)         @ 07:01  -   @ 07:00  --------------------------------------------------------  IN: 600 mL / OUT: 4 mL / NET: 596 mL      CAPILLARY BLOOD GLUCOSE          PHYSICAL EXAM: See below      HOSPITAL MEDICATIONS:  MEDICATIONS  (STANDING):  tamsulosin 0.4 milliGRAM(s) Oral at bedtime  atorvastatin 20 milliGRAM(s) Oral at bedtime  metoprolol 25 milliGRAM(s) Oral two times a day  amLODIPine   Tablet 2.5 milliGRAM(s) Oral daily  cefuroxime   Tablet 500 milliGRAM(s) Oral every 12 hours    MEDICATIONS  (PRN):  zaleplon 5 milliGRAM(s) Oral at bedtime PRN Insomnia      LABS:                        15.1   8.25  )-----------( 214      ( 22 Aug 2017 07:15 )             44.1     08    140  |  101  |  14  ----------------------------<  106<H>  4.7   |  26  |  1.26    Ca    9.3      22 Aug 2017 07:21      PT/INR - ( 21 Aug 2017 13:40 )   PT: 10.6 sec;   INR: 0.98 ratio         PTT - ( 21 Aug 2017 13:40 )  PTT:29.2 sec          MICROBIOLOGY:     RADIOLOGY:  [x] Reviewed and interpreted by me and discussed with out patient pulmonology IP Fellow

## 2017-08-22 NOTE — PROVIDER CONTACT NOTE (MEDICATION) - ACTION/TREATMENT ORDERED:
Isis Perales NP made aware. Advised sonata is more appropriate sleeping medication, will order as standard bedtime med. Continue to monitor.

## 2017-08-22 NOTE — DISCHARGE NOTE ADULT - HOSPITAL COURSE
PT  WITH  HEMOPTYSIS,  RESOLVED   HB  STABLE   SEEN BY  ROSIBEL REDMOND, WHO  SPOKE  WITH [facility]    CT  WITH  Bronchiectasis, OLD, WITH ?  INFECTION    D C ON 7  DAYS  OF CEFTIN    WILL NEED  OP,  FOB, AT  [facility]

## 2017-08-22 NOTE — PROVIDER CONTACT NOTE (MEDICATION) - SITUATION
Pt requests myrebetric 50mg for dry mouth, cemiline 30mg for urinary frequency and lunesta for insomnia.

## 2017-08-22 NOTE — DISCHARGE NOTE ADULT - CARE PLAN
Principal Discharge DX:	Hemoptysis  Goal:	resolved  Instructions for follow-up, activity and diet:	Follow up with Dr. Young in 2 days  Please report to your doctor if you have any further signs of bleeding or go to the Emergency room  Secondary Diagnosis:	Carcinoma of nasopharyngeal wall  Instructions for follow-up, activity and diet:	Follow up with your Oncologist  Secondary Diagnosis:	HTN (hypertension)  Instructions for follow-up, activity and diet:	Low salt diet  Activity as tolerated.  Take all medication as prescribed.  Follow up with your medical doctor for routine blood pressure monitoring at your next visit.  Notify your doctor if you have any of the following symptoms:   Dizziness, Lightheadedness, Blurry vision, Headache, Chest pain, Shortness of breath

## 2017-08-22 NOTE — DISCHARGE NOTE ADULT - MEDICATION SUMMARY - MEDICATIONS TO TAKE
I will START or STAY ON the medications listed below when I get home from the hospital:    tamsulosin  -- 0.8 milligram(s) by mouth once a day  -- Indication: For BPH (benign prostatic hyperplasia)    Crestor  -- 20 milligram(s) by mouth once a day  -- Indication: For High cholesterol    Lunesta  -- 30 milligram(s) by mouth once a day (at bedtime)  -- Indication: For sleep    metoprolol  -- 50 milligram(s) by mouth once a day  -- Indication: For Hypertension    amLODIPine 2.5 mg oral tablet  -- 1 tab(s) by mouth once a day  -- Indication: For Hypertension    cefuroxime 500 mg oral tablet  -- 1 tab(s) by mouth every 12 hours  -- Indication: For Brochiectasis    Evoxac 30 mg oral capsule  -- 1 cap(s) by mouth 3 times a day  -- Indication: For dry mouth    mirabegron 50 mg oral tablet, extended release  -- 1 tab(s) by mouth once a day  -- Indication: For Overactive Bladder

## 2017-08-22 NOTE — PROGRESS NOTE ADULT - SUBJECTIVE AND OBJECTIVE BOX
- Patient seen and examined.  - In summary, patient is a 66y year old man who presented with  HEMOPTYSIS (21 Aug 2017 19:34)  - Today, patient is without complaints.         *****MEDICATIONS:    MEDICATIONS  (STANDING):  tamsulosin 0.4 milliGRAM(s) Oral at bedtime  atorvastatin 20 milliGRAM(s) Oral at bedtime  metoprolol 25 milliGRAM(s) Oral two times a day  HYDROcodone/homatropine Syrup 5 milliLiter(s) Oral three times a day  amLODIPine   Tablet 2.5 milliGRAM(s) Oral daily    MEDICATIONS  (PRN):  zaleplon 5 milliGRAM(s) Oral at bedtime PRN Insomnia           ***** REVIEW OF SYSTEM:  GEN: no fever, no chills, no pain  RESP: no SOB, no cough, no sputum  CVS: no chest pain, no palpitations, no edema  GI: no abdominal pain, no nausea, no vomiting, no constipation, no diarrhea  : no dysurea, no frequency  NEURO: no headache, no diziness  PSYCH: no depression, not anxious  Derm : no itching, no rash         ***** VITAL SIGNS:  T(F): 97.7 (08-22-17 @ 05:45), Max: 98 (08-21-17 @ 18:30)  HR: 77 (08-22-17 @ 05:45) (68 - 81)  BP: 156/71 (08-22-17 @ 05:45) (131/80 - 160/87)  RR: 18 (08-22-17 @ 05:45) (16 - 18)  SpO2: 94% (08-22-17 @ 05:45) (94% - 100%)  Wt(kg): --  ,   I&O's Summary    21 Aug 2017 07:01  -  22 Aug 2017 07:00  --------------------------------------------------------  IN: 600 mL / OUT: 4 mL / NET: 596 mL             *****PHYSICAL EXAM:  GEN: A&O X 3 , NAD , comfortable  HEENT: NCAT, EOMI, MMM, no icterus  NECK: Supple, No JVD  CVS: S1S2 , regular , No M/R/G appreciated  PULM: CTA B/L,  no W/R/R appreciated  ABD.: soft. non tender, non distended,  bowel sounds present  Extrem: intact pulses , no edema noted  Derm: No rash or ecchymosis noted  PSYCH: normal mood, no depression, not anxious         *****LAB AND IMAGING:                        15.1   8.25  )-----------( 214      ( 22 Aug 2017 07:15 )             44.1               08-22    140  |  101  |  14  ----------------------------<  106<H>  4.7   |  26  |  1.26    Ca    9.3      22 Aug 2017 07:21      PT/INR - ( 21 Aug 2017 13:40 )   PT: 10.6 sec;   INR: 0.98 ratio         PTT - ( 21 Aug 2017 13:40 )  PTT:29.2 sec                     [All pertinent recent Imaging/Reports reviewed]         *****A S S E S S M E N T   A N D   P L A N :  66M with of nasopharyngeal ca adm with hemoptysis, HTN  follow up BP  cont current meds for now  check echo  check lipid  ct chest   pulmonary eval        __________________________  GAURAV Payton D.O.

## 2017-08-22 NOTE — DISCHARGE NOTE ADULT - PROVIDER TOKENS
FREE:[LAST:[Hannah],FIRST:[Delta],PHONE:[(   )    -],FAX:[(   )    -],ADDRESS:[[facility]  Pulmonologist]]

## 2017-08-22 NOTE — PROVIDER CONTACT NOTE (MEDICATION) - ACTION/TREATMENT ORDERED:
Isis Perales NP made aware. Explained the hospital does not carry these medications, advised for family to bring pt home medication to verify by pharmacy so pt can continue taking home meds.

## 2017-08-22 NOTE — PROGRESS NOTE ADULT - ASSESSMENT
s/p  hemoptysis,  hb stable   SEEN BY  SARI REDMOND,  CT  WITH OLD  BRONCHIECTASIS    ? INFECTION    DC TODAY  ON 7 DAY  COURSE OF CEFTIN   PT  WILL F/P WITH RACHNA SHAW,  AT  [facility],  MAY NEED  FOB  AS  AN OP

## 2017-08-22 NOTE — PROVIDER CONTACT NOTE (MEDICATION) - BACKGROUND
Pt admitted for hemoptysis. PMH nasopharangeal CA, BPH, HTN, hypercholesteremia
Pt admitted for hemoptysis. PMH BPH, HTN

## 2017-08-22 NOTE — DISCHARGE NOTE ADULT - PLAN OF CARE
resolved Follow up with Dr. Young in 2 days  Please report to your doctor if you have any further signs of bleeding or go to the Emergency room Follow up with your Oncologist Low salt diet  Activity as tolerated.  Take all medication as prescribed.  Follow up with your medical doctor for routine blood pressure monitoring at your next visit.  Notify your doctor if you have any of the following symptoms:   Dizziness, Lightheadedness, Blurry vision, Headache, Chest pain, Shortness of breath

## 2017-08-22 NOTE — CONSULT NOTE ADULT - ASSESSMENT
66M patient with PMHx sig for nasopharyngeal CA in the past treated with chemo and radiation, for hemoptysis for 2days, with +blood clot by vocal cord going to trachea
pt with hx of nasopharyngeal ca with hemoptysis  doubt mitral stenosis as per exam  check echo  for pulmonary htn  will adjust bp meds  check lipid  ct chest   pulmonary sulema
66 with pharyngeal CA, smoker, with bronchiectasis presents with significant but resolving hemoptysis.  likely from Bronchiectasis but unclear and would require bronchoscopy but as patient does not want any non emergent treatment except with his out patient pulmonologist.  - His case was reviewed with Dr. Young's Fellow and CT findings are stable except for the new GGO that likely represent blood.   - would treat for Bronchiectasis with bleeding with 7 days of levaquin.  - Can follow up with Dr. Young for bronchoscopy if no more bleeding.   - If bleeding recurs strongly encouraged to return to the ER for urgent intervention.     Arsenio Herring MD PGY5  Pulmonary and Critical Care Fellow  p# [phone]

## 2017-08-22 NOTE — CONSULT NOTE ADULT - ATTENDING COMMENTS
65 yo man with HTN, BPH, pharyngeal CA s/p chemo and xrt in 2010, RML/RUL bronchiectasis (as well as prior pulmonary nodules and lymphadenopathy of unclear etiology) admitted for hemoptysis. Pt without active hemoptysis since Sunday (did have old clot production). Pt denies any fever, sweats, weight loss or prior cough/sputum production. Recent travel to [facility] but w/o sick contacts. Most likely has hemoptysis from bronchiectatic regions, possibly from superinfection of these areas. Lack of any prior sx goes against TB or MAC infection. Recurrence of malignancy is a possibility but seems less likely given frequent surveillance at [facility]. Would start empiric course abx for PNA and a a bronchoscopy is warranted for evaluation for localization, eval for nodularity and BAL. Pt wants to follow at [facility] with his pulmonologist. Given lack of bleeding for past 48 hours and pt insistence for bronchoscopy at [facility] will allow d/c w/ abx. Pulmonary team at [facility] contacted (MD and ) and they will facilitate early appt and early bronch for pt. Pt instructed to return to hospital for any recurrence of hemoptysis.

## 2017-08-25 RX ORDER — ESZOPICLONE 2 MG/1
0 TABLET, COATED ORAL
Qty: 0 | Refills: 0 | COMMUNITY

## 2017-08-25 RX ORDER — METOPROLOL TARTRATE 50 MG
0 TABLET ORAL
Qty: 0 | Refills: 0 | COMMUNITY

## 2017-08-25 RX ORDER — TAMSULOSIN HYDROCHLORIDE 0.4 MG/1
0 CAPSULE ORAL
Qty: 0 | Refills: 0 | COMMUNITY

## 2017-08-25 RX ORDER — ROSUVASTATIN CALCIUM 5 MG/1
0 TABLET ORAL
Qty: 0 | Refills: 0 | COMMUNITY

## 2017-08-25 RX ORDER — ASPIRIN/CALCIUM CARB/MAGNESIUM 324 MG
1 TABLET ORAL
Qty: 0 | Refills: 0 | COMMUNITY

## 2017-08-28 ENCOUNTER — APPOINTMENT (OUTPATIENT)
Dept: OTOLARYNGOLOGY | Facility: CLINIC | Age: 67
End: 2017-08-28

## 2018-04-02 PROBLEM — I10 ESSENTIAL (PRIMARY) HYPERTENSION: Chronic | Status: ACTIVE | Noted: 2017-08-21

## 2018-04-02 PROBLEM — E78.00 PURE HYPERCHOLESTEROLEMIA, UNSPECIFIED: Chronic | Status: ACTIVE | Noted: 2017-08-21

## 2018-04-02 PROBLEM — N40.0 BENIGN PROSTATIC HYPERPLASIA WITHOUT LOWER URINARY TRACT SYMPTOMS: Chronic | Status: ACTIVE | Noted: 2017-08-21

## 2018-04-04 ENCOUNTER — APPOINTMENT (OUTPATIENT)
Dept: OTOLARYNGOLOGY | Facility: CLINIC | Age: 68
End: 2018-04-04
Payer: MEDICARE

## 2018-04-04 VITALS
TEMPERATURE: 98.2 F | HEIGHT: 70 IN | OXYGEN SATURATION: 96 % | HEART RATE: 80 BPM | BODY MASS INDEX: 23.62 KG/M2 | WEIGHT: 165 LBS | DIASTOLIC BLOOD PRESSURE: 75 MMHG | SYSTOLIC BLOOD PRESSURE: 124 MMHG

## 2018-04-04 DIAGNOSIS — Z87.891 PERSONAL HISTORY OF NICOTINE DEPENDENCE: ICD-10-CM

## 2018-04-04 DIAGNOSIS — R13.13 DYSPHAGIA, PHARYNGEAL PHASE: ICD-10-CM

## 2018-04-04 PROCEDURE — 99214 OFFICE O/P EST MOD 30 MIN: CPT | Mod: 25

## 2018-04-04 PROCEDURE — 31575 DIAGNOSTIC LARYNGOSCOPY: CPT

## 2018-04-09 ENCOUNTER — FORM ENCOUNTER (OUTPATIENT)
Age: 68
End: 2018-04-09

## 2018-04-10 ENCOUNTER — OUTPATIENT (OUTPATIENT)
Dept: OUTPATIENT SERVICES | Facility: HOSPITAL | Age: 68
LOS: 1 days | End: 2018-04-10
Payer: MEDICARE

## 2018-04-10 DIAGNOSIS — Z98.890 OTHER SPECIFIED POSTPROCEDURAL STATES: Chronic | ICD-10-CM

## 2018-04-10 LAB — GLUCOSE BLDC GLUCOMTR-MCNC: 95 MG/DL — SIGNIFICANT CHANGE UP (ref 70–99)

## 2018-04-10 PROCEDURE — 78815 PET IMAGE W/CT SKULL-THIGH: CPT

## 2018-04-10 PROCEDURE — 82962 GLUCOSE BLOOD TEST: CPT

## 2018-04-10 PROCEDURE — 78815 PET IMAGE W/CT SKULL-THIGH: CPT | Mod: 26

## 2018-04-10 PROCEDURE — A9552: CPT

## 2018-04-12 ENCOUNTER — OTHER (OUTPATIENT)
Age: 68
End: 2018-04-12

## 2019-03-13 NOTE — PATIENT PROFILE ADULT. - NS PRO OT REFERRAL QUES 1 YN
I discussed case with Resident. I agree with Resident’s plan.   I did not see and evaluate the patient today.  Additional recommendations include: None    no

## 2019-09-03 ENCOUNTER — FORM ENCOUNTER (OUTPATIENT)
Age: 69
End: 2019-09-03

## 2019-09-04 ENCOUNTER — APPOINTMENT (OUTPATIENT)
Dept: OTOLARYNGOLOGY | Facility: CLINIC | Age: 69
End: 2019-09-04
Payer: MEDICARE

## 2019-09-04 ENCOUNTER — OUTPATIENT (OUTPATIENT)
Dept: OUTPATIENT SERVICES | Facility: HOSPITAL | Age: 69
LOS: 1 days | End: 2019-09-04
Payer: MEDICARE

## 2019-09-04 VITALS
HEIGHT: 70 IN | WEIGHT: 172 LBS | OXYGEN SATURATION: 98 % | HEART RATE: 72 BPM | DIASTOLIC BLOOD PRESSURE: 74 MMHG | BODY MASS INDEX: 24.62 KG/M2 | SYSTOLIC BLOOD PRESSURE: 130 MMHG

## 2019-09-04 DIAGNOSIS — Z98.890 OTHER SPECIFIED POSTPROCEDURAL STATES: Chronic | ICD-10-CM

## 2019-09-04 PROCEDURE — 71046 X-RAY EXAM CHEST 2 VIEWS: CPT

## 2019-09-04 PROCEDURE — 71046 X-RAY EXAM CHEST 2 VIEWS: CPT | Mod: 26

## 2019-09-04 PROCEDURE — 31575 DIAGNOSTIC LARYNGOSCOPY: CPT

## 2019-09-04 PROCEDURE — 99212 OFFICE O/P EST SF 10 MIN: CPT | Mod: 25

## 2019-09-04 NOTE — PHYSICAL EXAM
[Nasal Endoscopy Performed] : nasal endoscopy was performed, see procedure section for findings [Midline] : trachea located in midline position [Normal] : cranial nerves 2-12 intact

## 2019-09-05 NOTE — ASSESSMENT
[FreeTextEntry1] : 69 y/o male with h/o NPC with no evidence of disease clinically.  \par CXR\par Follow up in 1 year

## 2019-09-05 NOTE — HISTORY OF PRESENT ILLNESS
[de-identified] : 68 y/o male with history of NPC [facility]. Has been JENNA to date.\par \par  [FreeTextEntry1] : No pain, no bleeding, no cough, no epistaxis, no other complaints. \par He reports TIA in September 2018\par Patient had no recent scans

## 2019-09-05 NOTE — PROCEDURE
[Topical Lidocaine] : topical lidocaine [Flexible Endoscope] : examined with the flexible endoscope [Nasal Mucosa Crusts / Sores] : no lesions [Nasal Mucosa] : no polyps [Normal] : the paranasal sinuses had no abnormalities [de-identified] : h/o nasopharyngeal cancer for surveillance

## 2019-09-11 NOTE — CONSULT NOTE ADULT - CONSULT REQUESTED BY NAME
LYN Taylor Fellow Niacinamide Pregnancy And Lactation Text: These medications are considered safe during pregnancy.

## 2019-10-22 ENCOUNTER — EMERGENCY (EMERGENCY)
Facility: HOSPITAL | Age: 69
LOS: 1 days | Discharge: HOME | End: 2019-10-22
Attending: EMERGENCY MEDICINE
Payer: MEDICARE

## 2019-10-22 VITALS
RESPIRATION RATE: 16 BRPM | HEART RATE: 73 BPM | SYSTOLIC BLOOD PRESSURE: 130 MMHG | DIASTOLIC BLOOD PRESSURE: 74 MMHG | OXYGEN SATURATION: 98 %

## 2019-10-22 VITALS
HEART RATE: 77 BPM | RESPIRATION RATE: 18 BRPM | DIASTOLIC BLOOD PRESSURE: 110 MMHG | OXYGEN SATURATION: 98 % | TEMPERATURE: 98 F | SYSTOLIC BLOOD PRESSURE: 167 MMHG | WEIGHT: 169.98 LBS

## 2019-10-22 DIAGNOSIS — Z98.890 OTHER SPECIFIED POSTPROCEDURAL STATES: Chronic | ICD-10-CM

## 2019-10-22 LAB
ALBUMIN SERPL ELPH-MCNC: 4.5 G/DL — SIGNIFICANT CHANGE UP (ref 3.3–5)
ALP SERPL-CCNC: 82 U/L — SIGNIFICANT CHANGE UP (ref 40–120)
ALT FLD-CCNC: 30 U/L — SIGNIFICANT CHANGE UP (ref 10–45)
ANION GAP SERPL CALC-SCNC: 11 MMOL/L — SIGNIFICANT CHANGE UP (ref 5–17)
APPEARANCE UR: CLEAR — SIGNIFICANT CHANGE UP
AST SERPL-CCNC: 27 U/L — SIGNIFICANT CHANGE UP (ref 10–40)
BACTERIA # UR AUTO: NEGATIVE — SIGNIFICANT CHANGE UP
BASOPHILS # BLD AUTO: 0.03 K/UL — SIGNIFICANT CHANGE UP (ref 0–0.2)
BASOPHILS NFR BLD AUTO: 0.3 % — SIGNIFICANT CHANGE UP (ref 0–2)
BILIRUB SERPL-MCNC: 0.6 MG/DL — SIGNIFICANT CHANGE UP (ref 0.2–1.2)
BILIRUB UR-MCNC: NEGATIVE — SIGNIFICANT CHANGE UP
BUN SERPL-MCNC: 11 MG/DL — SIGNIFICANT CHANGE UP (ref 7–23)
CALCIUM SERPL-MCNC: 9.6 MG/DL — SIGNIFICANT CHANGE UP (ref 8.4–10.5)
CHLORIDE SERPL-SCNC: 103 MMOL/L — SIGNIFICANT CHANGE UP (ref 96–108)
CO2 SERPL-SCNC: 26 MMOL/L — SIGNIFICANT CHANGE UP (ref 22–31)
COLOR SPEC: COLORLESS — SIGNIFICANT CHANGE UP
CREAT SERPL-MCNC: 0.93 MG/DL — SIGNIFICANT CHANGE UP (ref 0.5–1.3)
DIFF PNL FLD: NEGATIVE — SIGNIFICANT CHANGE UP
EOSINOPHIL # BLD AUTO: 0.11 K/UL — SIGNIFICANT CHANGE UP (ref 0–0.5)
EOSINOPHIL NFR BLD AUTO: 1.2 % — SIGNIFICANT CHANGE UP (ref 0–6)
EPI CELLS # UR: 0 /HPF — SIGNIFICANT CHANGE UP
GLUCOSE SERPL-MCNC: 100 MG/DL — HIGH (ref 70–99)
GLUCOSE UR QL: NEGATIVE — SIGNIFICANT CHANGE UP
HCT VFR BLD CALC: 44.6 % — SIGNIFICANT CHANGE UP (ref 39–50)
HGB BLD-MCNC: 15.1 G/DL — SIGNIFICANT CHANGE UP (ref 13–17)
HYALINE CASTS # UR AUTO: 0 /LPF — SIGNIFICANT CHANGE UP (ref 0–2)
IMM GRANULOCYTES NFR BLD AUTO: 0.3 % — SIGNIFICANT CHANGE UP (ref 0–1.5)
KETONES UR-MCNC: NEGATIVE — SIGNIFICANT CHANGE UP
LEUKOCYTE ESTERASE UR-ACNC: NEGATIVE — SIGNIFICANT CHANGE UP
LYMPHOCYTES # BLD AUTO: 2.14 K/UL — SIGNIFICANT CHANGE UP (ref 1–3.3)
LYMPHOCYTES # BLD AUTO: 23.4 % — SIGNIFICANT CHANGE UP (ref 13–44)
MCHC RBC-ENTMCNC: 30.9 PG — SIGNIFICANT CHANGE UP (ref 27–34)
MCHC RBC-ENTMCNC: 33.9 GM/DL — SIGNIFICANT CHANGE UP (ref 32–36)
MCV RBC AUTO: 91.2 FL — SIGNIFICANT CHANGE UP (ref 80–100)
MONOCYTES # BLD AUTO: 0.89 K/UL — SIGNIFICANT CHANGE UP (ref 0–0.9)
MONOCYTES NFR BLD AUTO: 9.7 % — SIGNIFICANT CHANGE UP (ref 2–14)
NEUTROPHILS # BLD AUTO: 5.93 K/UL — SIGNIFICANT CHANGE UP (ref 1.8–7.4)
NEUTROPHILS NFR BLD AUTO: 65.1 % — SIGNIFICANT CHANGE UP (ref 43–77)
NITRITE UR-MCNC: NEGATIVE — SIGNIFICANT CHANGE UP
NRBC # BLD: 0 /100 WBCS — SIGNIFICANT CHANGE UP (ref 0–0)
PH UR: 7 — SIGNIFICANT CHANGE UP (ref 5–8)
PLATELET # BLD AUTO: 213 K/UL — SIGNIFICANT CHANGE UP (ref 150–400)
POTASSIUM SERPL-MCNC: 4.7 MMOL/L — SIGNIFICANT CHANGE UP (ref 3.5–5.3)
POTASSIUM SERPL-SCNC: 4.7 MMOL/L — SIGNIFICANT CHANGE UP (ref 3.5–5.3)
PROT SERPL-MCNC: 7.6 G/DL — SIGNIFICANT CHANGE UP (ref 6–8.3)
PROT UR-MCNC: NEGATIVE — SIGNIFICANT CHANGE UP
RBC # BLD: 4.89 M/UL — SIGNIFICANT CHANGE UP (ref 4.2–5.8)
RBC # FLD: 13 % — SIGNIFICANT CHANGE UP (ref 10.3–14.5)
RBC CASTS # UR COMP ASSIST: 0 /HPF — SIGNIFICANT CHANGE UP (ref 0–4)
SODIUM SERPL-SCNC: 140 MMOL/L — SIGNIFICANT CHANGE UP (ref 135–145)
SP GR SPEC: 1 — LOW (ref 1.01–1.02)
UROBILINOGEN FLD QL: NEGATIVE — SIGNIFICANT CHANGE UP
WBC # BLD: 9.13 K/UL — SIGNIFICANT CHANGE UP (ref 3.8–10.5)
WBC # FLD AUTO: 9.13 K/UL — SIGNIFICANT CHANGE UP (ref 3.8–10.5)
WBC UR QL: 0 /HPF — SIGNIFICANT CHANGE UP (ref 0–5)

## 2019-10-22 PROCEDURE — 93010 ELECTROCARDIOGRAM REPORT: CPT

## 2019-10-22 PROCEDURE — 93005 ELECTROCARDIOGRAM TRACING: CPT

## 2019-10-22 PROCEDURE — 99284 EMERGENCY DEPT VISIT MOD MDM: CPT

## 2019-10-22 PROCEDURE — 85027 COMPLETE CBC AUTOMATED: CPT

## 2019-10-22 PROCEDURE — 80053 COMPREHEN METABOLIC PANEL: CPT

## 2019-10-22 PROCEDURE — 99283 EMERGENCY DEPT VISIT LOW MDM: CPT

## 2019-10-22 PROCEDURE — 81001 URINALYSIS AUTO W/SCOPE: CPT

## 2019-10-22 NOTE — ED PROVIDER NOTE - NSFOLLOWUPINSTRUCTIONS_ED_ALL_ED_FT
You were evaluated in the Emergency Department for High Blood Pressure, and were seen by a doctor, and had blood/urine tests performed.  Your tests did not have any significant abnormalities; we recommend you:    1. See your primary care doctor / doctor managing your blood pressure as soon as possible.  2. Continue your home medications as prescribed.    ***Return immediately if you have new or worsening symptoms***

## 2019-10-22 NOTE — ED PROVIDER NOTE - OBJECTIVE STATEMENT
68y/o M with h/o HTN, BPH, HLD presenting with elevated blood pressure; patient states he felt warm tonight, and noted that his blood pressure was elevated, highest measurement at home ws 190/100; took extra metoprolol and losartan and then came in for evaluation.  No headache, no vision changes, no numbness/weakness, no chest pain, no shortness of breath.  Denies fever/chills.  No recent illness, no recent travel.

## 2019-10-22 NOTE — ED PROVIDER NOTE - DISPOSITION TYPE
52 yo M with PMHx of NICM s/p HM II (8/24/16), HLD, HTN, VT s/p ICD; s/p OHT 2/9/17 CMV D-/R+ on maintenance with MMF and Prednisone; admitted 3/28/17 due to lymphocele foul smell/draining more on left inguinal area. Otherwise patient feeling well, afebrile. Patient went to OR 3/30/17 for: Irrigation and debridement of the right groin, including skin, subcutaneous tissue and fascia; measuring approximately 7 cm x 4 cm and Rectus femoris muscle flap to the groin.    - both wound culture positive for E faecalis (ampicillin-S)  - may switch to Amoxicillin 500 mg PO TID 10-14 days  - may follow at ID clinic next week if needed     DISCHARGE

## 2019-10-22 NOTE — ED PROVIDER NOTE - CLINICAL SUMMARY MEDICAL DECISION MAKING FREE TEXT BOX
Patient presenting for evaluation of HTN; plan to check labs: cbc (eval for anemia), CMP (to evaluate for electrolyte abnormalities or renal/liver dysfunction) and UA (eval for proteinuria).  ECG obtained, non-ischemic.  If no acute lab abnormalities and BP continues to improve, dc with continued evaluation/management as outpatient.

## 2019-10-22 NOTE — ED ADULT NURSE NOTE - NSIMPLEMENTINTERV_GEN_ALL_ED
Implemented All Universal Safety Interventions:  [place] to call system. Call bell, personal items and telephone within reach. Instruct patient to call for assistance. Room bathroom lighting operational. Non-slip footwear when patient is off stretcher. Physically safe environment: no spills, clutter or unnecessary equipment. Stretcher in lowest position, wheels locked, appropriate side rails in place.

## 2019-10-22 NOTE — ED PROVIDER NOTE - PATIENT PORTAL LINK FT
You can access the FollowMyHealth Patient Portal offered by Health system by registering at the following website: [email or web address]. By joining [facility]’s FollowMyHealth portal, you will also be able to view your health information using other applications (apps) compatible with our system.

## 2020-05-08 ENCOUNTER — APPOINTMENT (OUTPATIENT)
Dept: ORTHOPEDIC SURGERY | Facility: CLINIC | Age: 70
End: 2020-05-08
Payer: MEDICARE

## 2020-05-08 VITALS — TEMPERATURE: 99.1 F

## 2020-05-08 DIAGNOSIS — M17.11 UNILATERAL PRIMARY OSTEOARTHRITIS, RIGHT KNEE: ICD-10-CM

## 2020-05-08 PROCEDURE — 20610 DRAIN/INJ JOINT/BURSA W/O US: CPT | Mod: RT

## 2020-05-08 PROCEDURE — 73562 X-RAY EXAM OF KNEE 3: CPT | Mod: RT

## 2020-05-08 PROCEDURE — 99204 OFFICE O/P NEW MOD 45 MIN: CPT | Mod: 25

## 2020-05-13 NOTE — HISTORY OF PRESENT ILLNESS
[de-identified] : Patient is complaining of severe pain in the right knee.  For the last few days when he was stepping down.  Mostly on the medial side.  Patient is a healthy 69-year-old male.  Who is retired.  NSAIDs are not helping him.  He has some pain on deep flexion.  No radiation. [Worsening] : worsening [Acetaminophen] : not relieved by acetaminophen [2] : a minimum pain level of 2/10 [8] : a maximum pain level of 8/10 [NSAIDs] : not relieved by nonsteroidal anti-inflammatory drugs

## 2020-05-13 NOTE — PHYSICAL EXAM
[Normal RUE] : Right Upper Extremity: No scars, rashes, lesions, ulcers, skin intact [Antalgic] : antalgic [Normal Finger/nose] : finger to nose coordination [Normal Touch] : sensation intact for touch [Poor Appearance] : well-appearing [Obese] : obese [Acute Distress] : not in acute distress [Normal] : No respiratory distress and lungs were clear to auscultation bilaterally [de-identified] : Patient appears stated age in no acute respiratory distress. Patient is alert oriented x3. Patient has normal mood and affect. \par Bilateral knee exam\par Range of motion of the knee is 0-120°. \par Skin is normal.  No rash.\par There is no effusion. No medial or lateral joint line tenderness. No swelling, no pitting edema.\par Overall alignment of the knee is then slight varus. Good anterior posterior stability. Firm endpoints on anterior and posterior drawer. \par Medial lateral stability is intact. Firm endpoint on medial and lateral stress testing .\par Reynaldo test is negative.\par Quadriceps strength 5/ 5. There is no loss of muscle volume in the thigh. \par Good anterior posterior and mediolateral stability.\par Sensation in the extremities intact. \par Discrimination is intact. Good DP and PT pulses.\par 		\par \par Bilateral hip exam\par On inspection of the hip shows skin is normal. No evidence of rash. \par No loss of muscle.  Abductor strength is 5 out of 5. Hip flexor strength is 5.\par Range of motion of the hip at 90° flexion internal rotation is 15° external rotation is 30° pain-free. \par Hip has good stability in anterior and posterior direction. \par On lateral decubitus  examination there is no tenderness in the greater trochanter. \par Lower Extremity Examination \par Bilateral lower extremity skin is normal. There is no rash. There is no edema and lymphadenopathy.  DP and PT pulses intact. Sensation is intact. [de-identified] : X-rays of the right knee shows moderate arthritis 3 views

## 2020-05-13 NOTE — DISCUSSION/SUMMARY
[de-identified] : Moderate arthritis of the right knee we will try conservative treatment NSAIDs physical therapy.  Patient is also considering a right knee injection\par \par Right knee injection\par The risks and benefits of injection was discussed with the patient. Verbal consent was obtained from the patient. The area was prepped with Betadine. A lateral suprapatellar approach was used. The knee was injected with 2 cc of Depo-Medrol 2 cc of lidocaine.  The procedure well. Band-Aid was applied.

## 2020-05-13 NOTE — HISTORY OF PRESENT ILLNESS
[Worsening] : worsening [de-identified] : Patient is complaining of severe pain in the right knee.  For the last few days when he was stepping down.  Mostly on the medial side.  Patient is a healthy 69-year-old male.  Who is retired.  NSAIDs are not helping him.  He has some pain on deep flexion.  No radiation. [2] : a minimum pain level of 2/10 [8] : a maximum pain level of 8/10 [Acetaminophen] : not relieved by acetaminophen [NSAIDs] : not relieved by nonsteroidal anti-inflammatory drugs

## 2020-05-13 NOTE — DISCUSSION/SUMMARY
[de-identified] : Moderate arthritis of the right knee we will try conservative treatment NSAIDs physical therapy.  Patient is also considering a right knee injection\par \par Right knee injection\par The risks and benefits of injection was discussed with the patient. Verbal consent was obtained from the patient. The area was prepped with Betadine. A lateral suprapatellar approach was used. The knee was injected with 2 cc of Depo-Medrol 2 cc of lidocaine.  The procedure well. Band-Aid was applied.

## 2020-05-13 NOTE — PHYSICAL EXAM
[Normal RUE] : Right Upper Extremity: No scars, rashes, lesions, ulcers, skin intact [Antalgic] : antalgic [Normal Touch] : sensation intact for touch [Normal Finger/nose] : finger to nose coordination [Poor Appearance] : well-appearing [Acute Distress] : not in acute distress [Obese] : obese [Normal] : no peripheral adenopathy appreciated [de-identified] : Patient appears stated age in no acute respiratory distress. Patient is alert oriented x3. Patient has normal mood and affect. \par Bilateral knee exam\par Range of motion of the knee is 0-120°. \par Skin is normal.  No rash.\par There is no effusion. No medial or lateral joint line tenderness. No swelling, no pitting edema.\par Overall alignment of the knee is then slight varus. Good anterior posterior stability. Firm endpoints on anterior and posterior drawer. \par Medial lateral stability is intact. Firm endpoint on medial and lateral stress testing .\par Reynaldo test is negative.\par Quadriceps strength 5/ 5. There is no loss of muscle volume in the thigh. \par Good anterior posterior and mediolateral stability.\par Sensation in the extremities intact. \par Discrimination is intact. Good DP and PT pulses.\par 		\par \par Bilateral hip exam\par On inspection of the hip shows skin is normal. No evidence of rash. \par No loss of muscle.  Abductor strength is 5 out of 5. Hip flexor strength is 5.\par Range of motion of the hip at 90° flexion internal rotation is 15° external rotation is 30° pain-free. \par Hip has good stability in anterior and posterior direction. \par On lateral decubitus  examination there is no tenderness in the greater trochanter. \par Lower Extremity Examination \par Bilateral lower extremity skin is normal. There is no rash. There is no edema and lymphadenopathy.  DP and PT pulses intact. Sensation is intact. [de-identified] : X-rays of the right knee shows moderate arthritis 3 views

## 2020-07-01 ENCOUNTER — NON-APPOINTMENT (OUTPATIENT)
Age: 70
End: 2020-07-01

## 2020-11-11 ENCOUNTER — APPOINTMENT (OUTPATIENT)
Dept: OTOLARYNGOLOGY | Facility: CLINIC | Age: 70
End: 2020-11-11
Payer: MEDICARE

## 2020-11-11 VITALS
WEIGHT: 175 LBS | HEIGHT: 70 IN | BODY MASS INDEX: 25.05 KG/M2 | TEMPERATURE: 97.6 F | HEART RATE: 73 BPM | SYSTOLIC BLOOD PRESSURE: 136 MMHG | OXYGEN SATURATION: 98 % | DIASTOLIC BLOOD PRESSURE: 83 MMHG

## 2020-11-11 PROCEDURE — 99213 OFFICE O/P EST LOW 20 MIN: CPT

## 2020-11-13 NOTE — PROCEDURE
[Topical Lidocaine] : topical lidocaine [Flexible Endoscope] : examined with the flexible endoscope [Nasal Mucosa Crusts / Sores] : no lesions [Nasal Mucosa] : no polyps [Normal] : the paranasal sinuses had no abnormalities [FreeTextEntry6] : Flexible fiberoptic laryngoscope advanced nasally, nasal cavity, nasopharynx,  oropharyngeal and hypopharyngeal walls and BOT, epiglottis all visualized, no masses, no fungating/ulcerating lesions evident; larynx visualized, true vocal cords abduct and adduct and meet in the midline no leuko/erythroplakic lesions . No pooling of secretions. [de-identified] : h/o nasopharyngeal cancer for surveillance

## 2020-11-13 NOTE — HISTORY OF PRESENT ILLNESS
[de-identified] : 71 y/o male with history of NPC [facility]. Has been JENNA to date.\par \par  [FreeTextEntry1] : No pain, no bleeding, no cough, no epistaxis, complain of increased phlegm, no other complaints. \par He reports TIA in September 2018\par Patient had no recent scans

## 2020-11-13 NOTE — ASSESSMENT
[FreeTextEntry1] : 71 y/o male with h/o NPC with no evidence of disease clinically.  \par \par Follow up in 1 year or sooner should worrisome symptoms develop.

## 2021-01-03 ENCOUNTER — FORM ENCOUNTER (OUTPATIENT)
Age: 71
End: 2021-01-03

## 2021-01-06 ENCOUNTER — APPOINTMENT (OUTPATIENT)
Dept: DISASTER EMERGENCY | Facility: HOSPITAL | Age: 71
End: 2021-01-06

## 2021-01-06 ENCOUNTER — OUTPATIENT (OUTPATIENT)
Dept: OUTPATIENT SERVICES | Facility: HOSPITAL | Age: 71
LOS: 1 days | End: 2021-01-06
Payer: MEDICARE

## 2021-01-06 VITALS
HEIGHT: 70 IN | DIASTOLIC BLOOD PRESSURE: 91 MMHG | RESPIRATION RATE: 17 BRPM | SYSTOLIC BLOOD PRESSURE: 141 MMHG | TEMPERATURE: 99 F | WEIGHT: 171.96 LBS | OXYGEN SATURATION: 100 % | HEART RATE: 91 BPM

## 2021-01-06 VITALS
HEART RATE: 81 BPM | RESPIRATION RATE: 20 BRPM | DIASTOLIC BLOOD PRESSURE: 96 MMHG | SYSTOLIC BLOOD PRESSURE: 172 MMHG | TEMPERATURE: 99 F | OXYGEN SATURATION: 100 %

## 2021-01-06 DIAGNOSIS — Z98.890 OTHER SPECIFIED POSTPROCEDURAL STATES: Chronic | ICD-10-CM

## 2021-01-06 DIAGNOSIS — U07.1 COVID-19: ICD-10-CM

## 2021-01-06 PROCEDURE — M0239: CPT

## 2021-01-06 RX ORDER — BAMLANIVIMAB 35 MG/ML
700 INJECTION, SOLUTION INTRAVENOUS ONCE
Refills: 0 | Status: COMPLETED | OUTPATIENT
Start: 2021-01-06 | End: 2021-01-06

## 2021-01-06 RX ADMIN — BAMLANIVIMAB 270 MILLIGRAM(S): 35 INJECTION, SOLUTION INTRAVENOUS at 12:20

## 2021-01-06 NOTE — CHART NOTE - NSCHARTNOTEFT_GEN_A_CORE
CC: Monoclonal Antibody Infusion/COVID 19 Positive  70yMale with pmhx of HTN, HLD, BPH, nasapharyngeal CA in remission since 2010, presents today for monoclonal antibody infusion. Patient endorses onset of symptoms 1/1 consisting of HA & body aches, tested + for COVID on 1/2, referred by PCP-Dr. Ambrose Tinsley for infusion.    exam/findings:  T(C): --  HR: --  BP: --  RR: --  SpO2: --      PE:   Appearance: NAD	  HEENT:   Normal oral mucosa,   Lymphatic: No lymphadenopathy  Cardiovascular: Normal S1 S2, No JVD, No murmurs, No edema  Respiratory: Lungs clear to auscultation	  Gastrointestinal:  Soft, Non-tender, + BS	  Skin: warm and dry  Neurologic: Non-focal  Extremities: Normal range of motion,    ASSESSMENT:  Pt is a 70yMale with pmhx of HTN, HLD, BPH, nasapharyngeal CA in remission since 2010, tested + for COVID on 1/2, referred by PCP-Dr. Boggs today to infusion center for Monoclonal antibody infusion (Bamlanivimab)  Symptoms/ Criteria: HA & bodyaches  Risk Profile includes: age >65, hx of HTN    PLAN:  - Infusion procedure explained to patient   - Consent for monoclonal antibody infusion obtained   - Risk & benefits discussed/all questions answered  - Infuse Bamlanivimab 700mg  IV over one hour   - Observe patient for one hour post infusion    I have reviewed the Bamlanivimab Emergency Use Authorization (EUA) and I have provided the patient or patient's caregiver with the following information:      1. FDA has authorized emergency use Bamlanivimab, which is not an FDA-approved biological product.  2. The patient or patient's caregiver has the option to accept or refuse administration of Bamlanivimab.   3. The significant known and potential risks and benefits of Bamlanivimab and the extent to which such risks and benefits are unknown.  4. Information on available alternative treatments and risks and benefits of those alternatives. CC: Monoclonal Antibody Infusion/COVID 19 Positive  70yMale with pmhx of HTN, HLD, BPH, nasapharyngeal CA in remission since 2010, presents today for monoclonal antibody infusion. Patient endorses onset of symptoms 1/1 consisting of HA & body aches, tested + for COVID on 1/2, referred by PCP-Dr. Ambrose Tinsley for infusion.    exam/findings:  Vital Signs Last 24 Hrs  T(C): 37.1 (06 Jan 2021 14:30), Max: 37.1 (06 Jan 2021 10:34)  T(F): 98.7 (06 Jan 2021 14:30), Max: 98.8 (06 Jan 2021 10:34)  HR: 81 (06 Jan 2021 14:30) (77 - 91)  BP: 172/96 (06 Jan 2021 14:30) (141/91 - 172/96)  RR: 20 (06 Jan 2021 14:30) (15 - 20)  SpO2: 100% (06 Jan 2021 14:30) (99% - 100%)      PE:   Appearance: NAD	  HEENT:   Normal oral mucosa,   Lymphatic: No lymphadenopathy  Cardiovascular: Normal S1 S2, No JVD, No murmurs, No edema  Respiratory: Lungs clear to auscultation	  Gastrointestinal:  Soft, Non-tender, + BS	  Skin: warm and dry  Neurologic: Non-focal  Extremities: Normal range of motion,    ASSESSMENT:  Pt is a 70yMale with pmhx of HTN, HLD, BPH, nasapharyngeal CA in remission since 2010, tested + for COVID on 1/2, referred by PCP-Dr. Boggs today to infusion center for Monoclonal antibody infusion (Bamlanivimab)  Symptoms/ Criteria: HA & bodyaches  Risk Profile includes: age >65, hx of HTN    PLAN:  - Infusion procedure explained to patient   - Consent for monoclonal antibody infusion obtained   - Risk & benefits discussed/all questions answered  - Infuse Bamlanivimab 700mg  IV over one hour   - Observe patient for one hour post infusion    I have reviewed the Bamlanivimab Emergency Use Authorization (EUA) and I have provided the patient or patient's caregiver with the following information:      1. FDA has authorized emergency use Bamlanivimab, which is not an FDA-approved biological product.  2. The patient or patient's caregiver has the option to accept or refuse administration of Bamlanivimab.   3. The significant known and potential risks and benefits of Bamlanivimab and the extent to which such risks and benefits are unknown.  4. Information on available alternative treatments and risks and benefits of those alternatives.

## 2021-09-15 ENCOUNTER — NON-APPOINTMENT (OUTPATIENT)
Age: 71
End: 2021-09-15

## 2021-09-30 ENCOUNTER — APPOINTMENT (OUTPATIENT)
Dept: UROLOGY | Facility: CLINIC | Age: 71
End: 2021-09-30
Payer: MEDICARE

## 2021-09-30 DIAGNOSIS — N13.8 BENIGN PROSTATIC HYPERPLASIA WITH LOWER URINARY TRACT SYMPMS: ICD-10-CM

## 2021-09-30 DIAGNOSIS — N40.1 BENIGN PROSTATIC HYPERPLASIA WITH LOWER URINARY TRACT SYMPMS: ICD-10-CM

## 2021-09-30 DIAGNOSIS — Z00.00 ENCOUNTER FOR GENERAL ADULT MEDICAL EXAMINATION W/OUT ABNORMAL FINDINGS: ICD-10-CM

## 2021-09-30 PROCEDURE — 99204 OFFICE O/P NEW MOD 45 MIN: CPT

## 2021-09-30 PROCEDURE — 51798 US URINE CAPACITY MEASURE: CPT

## 2021-09-30 NOTE — ADDENDUM
[FreeTextEntry1] : Entered by Johnnie Lora, acting as scribe for Dr. Atul Ritchie.\par \par The documentation recorded by the scribe accurately reflects the service I personally performed and the decisions made by me.

## 2021-09-30 NOTE — HISTORY OF PRESENT ILLNESS
[FreeTextEntry1] : Please refer to URO Consult note \par \par new fail bph \par tried Flomax Avodart Myrbetriq \par was told to undergo surgery \par PSA 0.7 \par getting second opinion \par get records \par  cc

## 2021-09-30 NOTE — LETTER BODY
[FreeTextEntry1] : Ambrose León MD\par [address] , \[place] [address]\par [phone]\par \par Dear Dr. Mcfarland, \par \par REASON FOR VISIT: BPH\par \par This is a 70 year-old English-speaking gentleman with nasopharyngeal cancer presenting with lower urinary tract symptoms and BPH. He was previously seen by another urologist. He wishes to get a second opinion.  Patient is here today for evaluation. Patient reports he has weak uroflow, frequency, and hesitancy despite medical therapy. He denies any hematuria or urinary incontinence. His symptoms are aggravated by hydration. He denies any alleviating factors. He is currently taking Flomax, Avodart and Myrbetriq without improvement. His previous urologist recommended surgical intervention.  He denies any pain. All other review of systems are negative. He has cancer in his family medical history. He has previous surgical history. Past medical history, family history and social history were inquired and were noncontributory to current condition. The patient does not use tobacco or drink alcohol. Medications and allergies were reviewed. He has no known allergies to medication. \par \par On examination, the patient is a healthy-appearing gentleman in no acute distress. He is alert and oriented follows commands. He has normal mood and affect. He is normocephalic. Neck is supple. Oral no thrush. Respirations are unlabored. His abdomen is soft and nontender. Bladder is nonpalpable. No CVA tenderness. Neurologically he is grossly intact. No peripheral edema. Skin without gross abnormality. He has normal male external genitalia. Normal meatus. Bilateral testes are descended intrascrotally and normal to palpation. On rectal examination, there is normal sphincter tone. The prostate is clinically benign without focal induration or nodularity.\par \par His PSA is 0.7, which is within normal limits. \par \par Post-void residual on bladder scan today was 120 cc.\par \par ASSESSMENT: BPH\par \par I counseled the patient on the various etiology of his symptoms. I discussed the natural history of BPH and the treatment options available. I discussed the options of conservative management with fluid in dietary restrictions, herbal therapy, medical therapy, and minimally invasive procedures.  Risk and benefits were discussed. I answered his questions. Given that medical therapy was ineffective, I recommended he consider surgical intervention. The patient will consider surgical intervention.  He will obtain urinalysis and urine culture today for further evaluation. Risks and alternatives were discussed. I answered the patient questions. The patient will follow-up as directed and will contact me with any questions or concerns. Thank you for the opportunity to participate in the care of Mr. CASAREZ. I will keep you updated on his progress.\par \par Plan: Get medical records. Follow up as directed.

## 2021-10-02 LAB
APPEARANCE: CLEAR
BACTERIA UR CULT: NORMAL
BACTERIA: NEGATIVE
BILIRUBIN URINE: NEGATIVE
BLOOD URINE: NEGATIVE
COLOR: NORMAL
GLUCOSE QUALITATIVE U: NEGATIVE
HYALINE CASTS: 0 /LPF
KETONES URINE: NEGATIVE
LEUKOCYTE ESTERASE URINE: NEGATIVE
MICROSCOPIC-UA: NORMAL
NITRITE URINE: NEGATIVE
PH URINE: 6
PROTEIN URINE: NEGATIVE
RED BLOOD CELLS URINE: 0 /HPF
SPECIFIC GRAVITY URINE: 1.01
SQUAMOUS EPITHELIAL CELLS: 0 /HPF
UROBILINOGEN URINE: NORMAL
WHITE BLOOD CELLS URINE: 0 /HPF

## 2021-10-05 ENCOUNTER — TRANSCRIPTION ENCOUNTER (OUTPATIENT)
Age: 71
End: 2021-10-05

## 2021-10-06 ENCOUNTER — TRANSCRIPTION ENCOUNTER (OUTPATIENT)
Age: 71
End: 2021-10-06

## 2021-10-07 ENCOUNTER — TRANSCRIPTION ENCOUNTER (OUTPATIENT)
Age: 71
End: 2021-10-07

## 2021-11-01 ENCOUNTER — TRANSCRIPTION ENCOUNTER (OUTPATIENT)
Age: 71
End: 2021-11-01

## 2021-11-04 ENCOUNTER — APPOINTMENT (OUTPATIENT)
Dept: UROLOGY | Facility: CLINIC | Age: 71
End: 2021-11-04
Payer: MEDICARE

## 2021-11-04 DIAGNOSIS — N40.1 BENIGN PROSTATIC HYPERPLASIA WITH LOWER URINARY TRACT SYMPMS: ICD-10-CM

## 2021-11-04 DIAGNOSIS — Z85.819 PERSONAL HISTORY OF MALIGNANT NEOPLASM OF UNSPECIFIED SITE OF LIP, ORAL CAVITY, AND PHARYNX: ICD-10-CM

## 2021-11-04 DIAGNOSIS — Z86.79 PERSONAL HISTORY OF OTHER DISEASES OF THE CIRCULATORY SYSTEM: ICD-10-CM

## 2021-11-04 DIAGNOSIS — C11.9 MALIGNANT NEOPLASM OF NASOPHARYNX, UNSPECIFIED: ICD-10-CM

## 2021-11-04 DIAGNOSIS — N13.8 BENIGN PROSTATIC HYPERPLASIA WITH LOWER URINARY TRACT SYMPMS: ICD-10-CM

## 2021-11-04 DIAGNOSIS — Z87.898 PERSONAL HISTORY OF OTHER SPECIFIED CONDITIONS: ICD-10-CM

## 2021-11-04 PROCEDURE — 99213 OFFICE O/P EST LOW 20 MIN: CPT

## 2021-11-04 RX ORDER — TAMSULOSIN HYDROCHLORIDE 0.4 MG/1
0.4 CAPSULE ORAL
Qty: 90 | Refills: 3 | Status: ACTIVE | COMMUNITY
Start: 2021-11-04 | End: 1900-01-01

## 2021-11-07 NOTE — HISTORY OF PRESENT ILLNESS
[FreeTextEntry1] : Please refer to URO Consult note \par \par FUA symptoms of BPH  \par discuss surgery \par concern about Vázquez placemat \par declines surgery  \par continue Flomax Avodart and Myrbetriq

## 2021-11-10 ENCOUNTER — TRANSCRIPTION ENCOUNTER (OUTPATIENT)
Age: 71
End: 2021-11-10

## 2021-11-11 ENCOUNTER — TRANSCRIPTION ENCOUNTER (OUTPATIENT)
Age: 71
End: 2021-11-11

## 2021-11-12 ENCOUNTER — APPOINTMENT (OUTPATIENT)
Dept: GASTROENTEROLOGY | Facility: CLINIC | Age: 71
End: 2021-11-12
Payer: MEDICARE

## 2021-11-12 VITALS — RESPIRATION RATE: 14 BRPM | HEART RATE: 70 BPM

## 2021-11-12 VITALS — WEIGHT: 166 LBS | HEIGHT: 70 IN | BODY MASS INDEX: 23.77 KG/M2

## 2021-11-12 DIAGNOSIS — U07.1 COVID-19: ICD-10-CM

## 2021-11-12 DIAGNOSIS — Z86.19 PERSONAL HISTORY OF OTHER INFECTIOUS AND PARASITIC DISEASES: ICD-10-CM

## 2021-11-12 DIAGNOSIS — C11.9 MALIGNANT NEOPLASM OF NASOPHARYNX, UNSPECIFIED: ICD-10-CM

## 2021-11-12 DIAGNOSIS — K59.09 OTHER CONSTIPATION: ICD-10-CM

## 2021-11-12 DIAGNOSIS — D12.6 BENIGN NEOPLASM OF COLON, UNSPECIFIED: ICD-10-CM

## 2021-11-12 DIAGNOSIS — R10.13 EPIGASTRIC PAIN: ICD-10-CM

## 2021-11-12 PROCEDURE — 83014 H PYLORI DRUG ADMIN: CPT

## 2021-11-12 PROCEDURE — 99204 OFFICE O/P NEW MOD 45 MIN: CPT | Mod: 25

## 2021-11-12 RX ORDER — TELMISARTAN 20 MG/1
TABLET ORAL
Refills: 0 | Status: ACTIVE | COMMUNITY

## 2021-11-12 NOTE — PHYSICAL EXAM

## 2021-11-17 ENCOUNTER — TRANSCRIPTION ENCOUNTER (OUTPATIENT)
Age: 71
End: 2021-11-17

## 2021-11-30 ENCOUNTER — TRANSCRIPTION ENCOUNTER (OUTPATIENT)
Age: 71
End: 2021-11-30

## 2021-11-30 NOTE — CONSULT LETTER
[Dear  ___] : Dear  [unfilled], [Consult Letter:] : I had the pleasure of evaluating your patient, [unfilled]. [Please see my note below.] : Please see my note below. [Consult Closing:] : Thank you very much for allowing me to participate in the care of this patient.  If you have any questions, please do not hesitate to contact me. [Sincerely,] : Sincerely, [FreeTextEntry3] : Sean Recio MD, FACP, AGAF, FAASLD\par  of Medicine\par [facility] [employer] [facility]\par

## 2021-11-30 NOTE — ASSESSMENT
[FreeTextEntry1] : 71-year-old male with history of BPH, nasopharyngeal cancer, essential hypertension, distant history of H. pylori and a tubular adenoma in 2018.  His constipation developed over the last 2 months.  His TSH is normal as is his CBC and CMP.  This is possibly functional constipation.  Given his history of nasopharyngeal cancer, his personal history of colon polyp,  he requests MRI of the abdomen pelvis which I am agreeing with.  We will assess for H. pylori with a breath test.

## 2021-11-30 NOTE — HISTORY OF PRESENT ILLNESS
[___ Month(s) Ago] : [unfilled] month(s) ago [Heartburn] : denies heartburn [Nausea] : denies nausea [Vomiting] : denies vomiting [Diarrhea] : denies diarrhea [Yellow Skin Or Eyes (Jaundice)] : denies jaundice [Abdominal Swelling] : denies abdominal swelling [Rectal Pain] : denies rectal pain [Constipation] : constipation [Abdominal Pain] : abdominal pain [Malignancy] : malignancy [_________] : Performed [unfilled] [Good Compliance] : good compliance with treatment [Fair Tolerance] : fair tolerance of treatment [Fair Symptom Control] : fair symptom control [Wt Gain ___ Lbs] : no recent weight gain [GERD] : no gastroesophageal reflux disease [Hiatus Hernia] : no hiatus hernia [Peptic Ulcer Disease] : no peptic ulcer disease [Pancreatitis] : no pancreatitis [Kidney Stone] : no kidney stone [Inflammatory Bowel Disease] : no inflammatory bowel disease [Irritable Bowel Syndrome] : no irritable bowel syndrome [Diverticulitis] : no diverticulitis [Alcohol Abuse] : no alcohol abuse [Abdominal Surgery] : no abdominal surgery [Appendectomy] : no appendectomy [Cholecystectomy] : no cholecystectomy [de-identified] : constipatoin [de-identified] : 70 yo male, paternal hx of colon cancer, with 2months of constipation.  His last colonoscopy was in 2018 which was normal except for tubular adenoma.  There is a paternal history of colorectal cancer.  Has occasional gerd.Recent cbc , cmp, tsh, psa all NORMAL. Describes last endoscopy 1998.Has covid 19 1/20; s/p antibody cocktail and then vaccinated with Pfizer vaccine. 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

## 2021-12-03 ENCOUNTER — TRANSCRIPTION ENCOUNTER (OUTPATIENT)
Age: 71
End: 2021-12-03

## 2021-12-09 ENCOUNTER — APPOINTMENT (OUTPATIENT)
Dept: MRI IMAGING | Facility: CLINIC | Age: 71
End: 2021-12-09

## 2021-12-15 ENCOUNTER — TRANSCRIPTION ENCOUNTER (OUTPATIENT)
Age: 71
End: 2021-12-15

## 2021-12-16 ENCOUNTER — APPOINTMENT (OUTPATIENT)
Dept: UROLOGY | Facility: CLINIC | Age: 71
End: 2021-12-16

## 2021-12-27 ENCOUNTER — TRANSCRIPTION ENCOUNTER (OUTPATIENT)
Age: 71
End: 2021-12-27

## 2021-12-28 ENCOUNTER — APPOINTMENT (OUTPATIENT)
Dept: UROLOGY | Facility: CLINIC | Age: 71
End: 2021-12-28

## 2021-12-30 ENCOUNTER — APPOINTMENT (OUTPATIENT)
Dept: UROLOGY | Facility: CLINIC | Age: 71
End: 2021-12-30
Payer: MEDICARE

## 2021-12-30 VITALS
TEMPERATURE: 97.3 F | OXYGEN SATURATION: 98 % | DIASTOLIC BLOOD PRESSURE: 85 MMHG | SYSTOLIC BLOOD PRESSURE: 156 MMHG | HEART RATE: 77 BPM

## 2021-12-30 PROCEDURE — 99213 OFFICE O/P EST LOW 20 MIN: CPT

## 2021-12-30 NOTE — HISTORY OF PRESENT ILLNESS
[FreeTextEntry1] : CC: LUTS\par \par Patient is 71 year old man with moderate LUTS that are bothersome. \par Obstructive and irritative symptoms. \par Prostate is 54 cc by Dr. Isaiah RICO. \par He is deciding between Greenlight, TURP and other options \par \par MEDHX: HTN, BPH\par FAMHX: colon cancer \par ROS: voiding symptoms, otherwise negative 10 system review\par SOCIAL: nonsmoker

## 2021-12-30 NOTE — ASSESSMENT
[FreeTextEntry1] : Today we discussed the natural history of BPH and bladder outlet obstruction, risks of progression, risks of detrusor myopathy/areflexic bladder, bladder stones, UTI, worsening symptoms, risk of retention and other issues. \par \par All treatment options were reviewed.  This included surveillance, all medical therapeutic options, all outlet procedures including office based, TURP, bipolar TURP, button vaporization, Rezum, Aquablation, Urolift, thulium/holmium, suprapubic/retropubic simple (open, robotic) prostatectomy.   \par \par All potential side effects of treatment were reviewed including, but not limited to: short term or permanent stress urinary incontinence and/or short term or permanent erectile dysfunction, rectal symptoms/pain, perineal pain, bleeding, rectal injury, recognized vs. unrecognized/delayed-recognition injury, TUR syndrome, risks of DVT, PE, MI, death, risks of cardiopulmonary/anesthesia related complications, positional injury, infection, ureteral injury/obstruction, bladder neck contracture, meatal stenosis, urethral stricture and other complications. \par \par My personal and institutional experience reviewed, as well as literature regarding these options.\par \par \par If the patient would like to proceed with bipolar TURP, he will contact office for scheduling, UCX, labs, and medical/cardiology clearances.

## 2022-01-06 ENCOUNTER — APPOINTMENT (OUTPATIENT)
Dept: UROLOGY | Facility: CLINIC | Age: 72
End: 2022-01-06

## 2022-01-19 ENCOUNTER — TRANSCRIPTION ENCOUNTER (OUTPATIENT)
Age: 72
End: 2022-01-19

## 2022-01-27 ENCOUNTER — TRANSCRIPTION ENCOUNTER (OUTPATIENT)
Age: 72
End: 2022-01-27

## 2022-01-28 ENCOUNTER — TRANSCRIPTION ENCOUNTER (OUTPATIENT)
Age: 72
End: 2022-01-28

## 2022-01-30 ENCOUNTER — TRANSCRIPTION ENCOUNTER (OUTPATIENT)
Age: 72
End: 2022-01-30

## 2022-02-03 ENCOUNTER — TRANSCRIPTION ENCOUNTER (OUTPATIENT)
Age: 72
End: 2022-02-03

## 2022-02-22 ENCOUNTER — NON-APPOINTMENT (OUTPATIENT)
Age: 72
End: 2022-02-22

## 2022-03-16 ENCOUNTER — APPOINTMENT (OUTPATIENT)
Dept: UROLOGY | Facility: HOSPITAL | Age: 72
End: 2022-03-16

## 2022-04-18 DIAGNOSIS — I10 ESSENTIAL (PRIMARY) HYPERTENSION: ICD-10-CM

## 2022-04-18 DIAGNOSIS — N40.1 BENIGN PROSTATIC HYPERPLASIA WITH LOWER URINARY TRACT SYMPMS: ICD-10-CM

## 2022-04-18 DIAGNOSIS — N13.8 BENIGN PROSTATIC HYPERPLASIA WITH LOWER URINARY TRACT SYMPMS: ICD-10-CM

## 2022-04-27 ENCOUNTER — OUTPATIENT (OUTPATIENT)
Dept: OUTPATIENT SERVICES | Facility: HOSPITAL | Age: 72
LOS: 1 days | End: 2022-04-27
Payer: MEDICARE

## 2022-04-27 VITALS
OXYGEN SATURATION: 99 % | WEIGHT: 162.04 LBS | RESPIRATION RATE: 18 BRPM | DIASTOLIC BLOOD PRESSURE: 83 MMHG | HEIGHT: 70 IN | TEMPERATURE: 98 F | HEART RATE: 84 BPM | SYSTOLIC BLOOD PRESSURE: 139 MMHG

## 2022-04-27 DIAGNOSIS — Z98.890 OTHER SPECIFIED POSTPROCEDURAL STATES: Chronic | ICD-10-CM

## 2022-04-27 DIAGNOSIS — N40.0 BENIGN PROSTATIC HYPERPLASIA WITHOUT LOWER URINARY TRACT SYMPTOMS: ICD-10-CM

## 2022-04-27 DIAGNOSIS — Z01.818 ENCOUNTER FOR OTHER PREPROCEDURAL EXAMINATION: ICD-10-CM

## 2022-04-27 DIAGNOSIS — I10 ESSENTIAL (PRIMARY) HYPERTENSION: ICD-10-CM

## 2022-04-27 DIAGNOSIS — N40.1 BENIGN PROSTATIC HYPERPLASIA WITH LOWER URINARY TRACT SYMPTOMS: ICD-10-CM

## 2022-04-27 LAB
ANION GAP SERPL CALC-SCNC: 12 MMOL/L — SIGNIFICANT CHANGE UP (ref 5–17)
BLD GP AB SCN SERPL QL: NEGATIVE — SIGNIFICANT CHANGE UP
BUN SERPL-MCNC: 15 MG/DL — SIGNIFICANT CHANGE UP (ref 7–23)
CALCIUM SERPL-MCNC: 10 MG/DL — SIGNIFICANT CHANGE UP (ref 8.4–10.5)
CHLORIDE SERPL-SCNC: 104 MMOL/L — SIGNIFICANT CHANGE UP (ref 96–108)
CO2 SERPL-SCNC: 25 MMOL/L — SIGNIFICANT CHANGE UP (ref 22–31)
CREAT SERPL-MCNC: 0.89 MG/DL — SIGNIFICANT CHANGE UP (ref 0.5–1.3)
EGFR: 91 ML/MIN/1.73M2 — SIGNIFICANT CHANGE UP
GLUCOSE SERPL-MCNC: 103 MG/DL — HIGH (ref 70–99)
HCT VFR BLD CALC: 43.4 % — SIGNIFICANT CHANGE UP (ref 39–50)
HGB BLD-MCNC: 14.2 G/DL — SIGNIFICANT CHANGE UP (ref 13–17)
MCHC RBC-ENTMCNC: 30.8 PG — SIGNIFICANT CHANGE UP (ref 27–34)
MCHC RBC-ENTMCNC: 32.7 GM/DL — SIGNIFICANT CHANGE UP (ref 32–36)
MCV RBC AUTO: 94.1 FL — SIGNIFICANT CHANGE UP (ref 80–100)
NRBC # BLD: 0 /100 WBCS — SIGNIFICANT CHANGE UP (ref 0–0)
PLATELET # BLD AUTO: 215 K/UL — SIGNIFICANT CHANGE UP (ref 150–400)
POTASSIUM SERPL-MCNC: 4.5 MMOL/L — SIGNIFICANT CHANGE UP (ref 3.5–5.3)
POTASSIUM SERPL-SCNC: 4.5 MMOL/L — SIGNIFICANT CHANGE UP (ref 3.5–5.3)
RBC # BLD: 4.61 M/UL — SIGNIFICANT CHANGE UP (ref 4.2–5.8)
RBC # FLD: 12.8 % — SIGNIFICANT CHANGE UP (ref 10.3–14.5)
RH IG SCN BLD-IMP: NEGATIVE — SIGNIFICANT CHANGE UP
SODIUM SERPL-SCNC: 141 MMOL/L — SIGNIFICANT CHANGE UP (ref 135–145)
WBC # BLD: 7.69 K/UL — SIGNIFICANT CHANGE UP (ref 3.8–10.5)
WBC # FLD AUTO: 7.69 K/UL — SIGNIFICANT CHANGE UP (ref 3.8–10.5)

## 2022-04-27 PROCEDURE — 86900 BLOOD TYPING SEROLOGIC ABO: CPT

## 2022-04-27 PROCEDURE — 87086 URINE CULTURE/COLONY COUNT: CPT

## 2022-04-27 PROCEDURE — G0463: CPT

## 2022-04-27 PROCEDURE — 85027 COMPLETE CBC AUTOMATED: CPT

## 2022-04-27 PROCEDURE — 86901 BLOOD TYPING SEROLOGIC RH(D): CPT

## 2022-04-27 PROCEDURE — 86850 RBC ANTIBODY SCREEN: CPT

## 2022-04-27 PROCEDURE — 80048 BASIC METABOLIC PNL TOTAL CA: CPT

## 2022-04-27 RX ORDER — MIRABEGRON 50 MG/1
1 TABLET, EXTENDED RELEASE ORAL
Qty: 0 | Refills: 0 | DISCHARGE

## 2022-04-27 RX ORDER — METOPROLOL TARTRATE 50 MG
50 TABLET ORAL
Qty: 0 | Refills: 0 | DISCHARGE

## 2022-04-27 RX ORDER — CEFAZOLIN SODIUM 1 G
2000 VIAL (EA) INJECTION ONCE
Refills: 0 | Status: COMPLETED | OUTPATIENT
Start: 2022-05-18 | End: 2022-05-18

## 2022-04-27 RX ORDER — ROSUVASTATIN CALCIUM 5 MG/1
20 TABLET ORAL
Qty: 0 | Refills: 0 | DISCHARGE

## 2022-04-27 RX ORDER — TAMSULOSIN HYDROCHLORIDE 0.4 MG/1
0.8 CAPSULE ORAL
Qty: 0 | Refills: 0 | DISCHARGE

## 2022-04-27 NOTE — H&P PST ADULT - PROBLEM SELECTOR PLAN 1
Scheduled for Greenlight laser vaporization of prostate on 5/18/22 with Dr. Ritchie  Preop instructions given  Labs CBC BMP UC and T&S performed in [facility]  Covid test on 5/18

## 2022-04-27 NOTE — H&P PST ADULT - FALL HARM RISK - UNIVERSAL INTERVENTIONS
Bed in lowest position, wheels locked, appropriate side rails in place/Call bell, personal items and telephone in reach/Instruct patient to call for assistance before getting out of bed or chair/Non-slip footwear when patient is out of bed/[place] to call system/Physically safe environment - no spills, clutter or unnecessary equipment/Purposeful Proactive Rounding/Room/bathroom lighting operational, light cord in reach

## 2022-04-27 NOTE — H&P PST ADULT - NSICDXPASTMEDICALHX_GEN_ALL_CORE_FT
PAST MEDICAL HISTORY:  BPH (benign prostatic hyperplasia)     Carcinoma of nasopharyngeal wall Dx in 2010, s/o chemo and radio therapy, in remission.    HTN (hypertension)     Hypercholesteremia      PAST MEDICAL HISTORY:  BPH (benign prostatic hyperplasia)     Carcinoma of nasopharyngeal wall Dx in 2010, s/o chemo and radio therapy, in remission.    History of TIAs 2019- on aspirin daily. No deficits    HTN (hypertension)     Hypercholesteremia

## 2022-04-27 NOTE — H&P PST ADULT - HISTORY OF PRESENT ILLNESS
This is a 71 year old male with past medical history of HTN, HLD, nasopharyngeal CA treated chemo/radiation in remission since Dec 2010 , and BPH  Presenting today for scheduled Greenlight laser vaporization of prostate on 5/18/22 with Dr. Ritchie      ** Received Monoclonal Antibody Infusion/ COVID 19 Positive on Jan 2021 - Symptoms HA and body aches, denies any hospitalization or oxygenation   **Covid test on 5/15/22 @ [facility] This is a 71 year old male with past medical history of HTN, HLD, nasopharyngeal CA treated chemo/radiation in remission since Dec 2010. Last seen in [facility] Nov 2021- with annual chest CT's and BPH/ OAB. Reports frequent nocturia nightly and urgency. Presenting today for scheduled Greenlight laser vaporization of prostate on 5/18/22 with Dr. Ritchie    ** Pt advised to obtain ENT evaluation prior to surgery  ** Received Monoclonal Antibody Infusion/ COVID 19 Positive on Jan 2021 - Symptoms HA and body aches, denies any hospitalization or oxygenation   **[facility]  ** Covid vaccinated- [employer] series This is a 71 year old male with past medical history of HTN, HLD, nasopharyngeal CA treated chemo/radiation in remission since Dec 2010. Last seen in [facility] Nov 2021- with annual chest CT's for prior h/o abnormal scan, no diagnosis of malignancy and h/o BPH/ OAB. Reports frequent nocturia nightly and urgency. Presenting today for scheduled Greenlight laser vaporization of prostate on 5/18/22 with Dr. Ritchie    ** Pt advised to obtain ENT evaluation prior to surgery  ** Received Monoclonal Antibody Infusion/ COVID 19 Positive on Jan 2021 - Symptoms HA and body aches, denies any hospitalization or oxygenation   **Covid test on 5/15/22 @ [facility]  ** Covid vaccinated- [employer] series

## 2022-04-27 NOTE — H&P PST ADULT - NSICDXPASTSURGICALHX_GEN_ALL_CORE_FT
PAST SURGICAL HISTORY:  History of back surgery      PAST SURGICAL HISTORY:  H/O carpal tunnel repair left    History of back surgery herniated  disc     PAST SURGICAL HISTORY:  H/O carpal tunnel repair left    History of back surgery herniated  disc    History of nasal surgery 2007

## 2022-04-27 NOTE — H&P PST ADULT - ATTENDING COMMENTS
Patient with BPH with persistent urinary symptoms despite medical therapy. Patient wishes to proceed with Greenlight laser vaporization of prostate possible transurethral resection of prostate to treat his condition. Alternatives were given. Risks including but not limited to bleeding, need for blood transfusion, infection, risk of anesthesia, pain, failure to cure, persistent urinary symptoms, bladder neck contractures, erectile dysfunction, urinary incontinence, need for future procedure, and injury to surrounding structures were discussed. Patient wishes to proceed with procedure.

## 2022-04-27 NOTE — H&P PST ADULT - OTHER CARE PROVIDERS
Dr. Octavio Hatch Cards [phone], Dr. Young ([facility] last seen Nov 2021) Dr. Octavio Hatch Cards [phone], Dr. Kayleen Young ([facility] last seen Nov 2021), Dr. Atul Garcia (ENT last seen Nov 2020- [employer])

## 2022-04-27 NOTE — H&P PST ADULT - NSANTHOSAYNRD_GEN_A_CORE
No. ALISSA screening performed.  STOP BANG Legend: 0-2 = LOW Risk; 3-4 = INTERMEDIATE Risk; 5-8 = HIGH Risk

## 2022-04-27 NOTE — H&P PST ADULT - FUNCTIONAL ASSESSMENT - DAILY ACTIVITY ASSESSMENT TYPE
Problem: Patient Care Overview (Adult)  Goal: Plan of Care Review  Outcome: Ongoing (interventions implemented as appropriate)    10/01/17 0332   Coping/Psychosocial Response Interventions   Plan Of Care Reviewed With patient   Patient Care Overview   Progress no change   Outcome Evaluation   Outcome Summary/Follow up Plan VSS. NSR/SB 58-62 on tele. Home meds resumed per Dr. Phillip. Denies pain this shift. Cardiac enzymes negative. EKG WNL. NPO for poss testing this AM. Will cont to monitor.         Problem: Acute Coronary Syndrome (ACS) (Adult)  Goal: Signs and Symptoms of Listed Potential Problems Will be Absent or Manageable (Acute Coronary Syndrome)  Outcome: Ongoing (interventions implemented as appropriate)      
Admission

## 2022-04-28 PROBLEM — Z86.73 PERSONAL HISTORY OF TRANSIENT ISCHEMIC ATTACK (TIA), AND CEREBRAL INFARCTION WITHOUT RESIDUAL DEFICITS: Chronic | Status: ACTIVE | Noted: 2022-04-27

## 2022-04-28 LAB
CULTURE RESULTS: SIGNIFICANT CHANGE UP
SPECIMEN SOURCE: SIGNIFICANT CHANGE UP

## 2022-04-29 ENCOUNTER — APPOINTMENT (OUTPATIENT)
Dept: OTOLARYNGOLOGY | Facility: CLINIC | Age: 72
End: 2022-04-29

## 2022-05-02 ENCOUNTER — TRANSCRIPTION ENCOUNTER (OUTPATIENT)
Age: 72
End: 2022-05-02

## 2022-05-04 ENCOUNTER — APPOINTMENT (OUTPATIENT)
Dept: OTOLARYNGOLOGY | Facility: CLINIC | Age: 72
End: 2022-05-04
Payer: MEDICARE

## 2022-05-04 VITALS
DIASTOLIC BLOOD PRESSURE: 83 MMHG | WEIGHT: 164 LBS | HEART RATE: 78 BPM | SYSTOLIC BLOOD PRESSURE: 135 MMHG | BODY MASS INDEX: 23.48 KG/M2 | HEIGHT: 70 IN | TEMPERATURE: 98.6 F

## 2022-05-04 DIAGNOSIS — J32.3 CHRONIC SPHENOIDAL SINUSITIS: ICD-10-CM

## 2022-05-04 DIAGNOSIS — J32.0 CHRONIC MAXILLARY SINUSITIS: ICD-10-CM

## 2022-05-04 DIAGNOSIS — J34.2 DEVIATED NASAL SEPTUM: ICD-10-CM

## 2022-05-04 PROCEDURE — 92511 NASOPHARYNGOSCOPY: CPT

## 2022-05-04 PROCEDURE — 99212 OFFICE O/P EST SF 10 MIN: CPT | Mod: 25

## 2022-05-16 ENCOUNTER — OUTPATIENT (OUTPATIENT)
Dept: OUTPATIENT SERVICES | Facility: HOSPITAL | Age: 72
LOS: 1 days | End: 2022-05-16
Payer: MEDICARE

## 2022-05-16 DIAGNOSIS — Z11.52 ENCOUNTER FOR SCREENING FOR COVID-19: ICD-10-CM

## 2022-05-16 DIAGNOSIS — Z98.890 OTHER SPECIFIED POSTPROCEDURAL STATES: Chronic | ICD-10-CM

## 2022-05-16 LAB — SARS-COV-2 RNA SPEC QL NAA+PROBE: SIGNIFICANT CHANGE UP

## 2022-05-16 PROCEDURE — C9803: CPT

## 2022-05-16 PROCEDURE — U0005: CPT

## 2022-05-16 PROCEDURE — U0003: CPT

## 2022-05-17 ENCOUNTER — TRANSCRIPTION ENCOUNTER (OUTPATIENT)
Age: 72
End: 2022-05-17

## 2022-05-18 ENCOUNTER — APPOINTMENT (OUTPATIENT)
Dept: UROLOGY | Facility: HOSPITAL | Age: 72
End: 2022-05-18

## 2022-05-18 ENCOUNTER — OUTPATIENT (OUTPATIENT)
Dept: OUTPATIENT SERVICES | Facility: HOSPITAL | Age: 72
LOS: 1 days | End: 2022-05-18
Payer: MEDICARE

## 2022-05-18 VITALS
TEMPERATURE: 98 F | RESPIRATION RATE: 16 BRPM | HEART RATE: 84 BPM | OXYGEN SATURATION: 97 % | HEIGHT: 70 IN | DIASTOLIC BLOOD PRESSURE: 81 MMHG | WEIGHT: 162.04 LBS | SYSTOLIC BLOOD PRESSURE: 144 MMHG

## 2022-05-18 DIAGNOSIS — Z98.890 OTHER SPECIFIED POSTPROCEDURAL STATES: Chronic | ICD-10-CM

## 2022-05-18 DIAGNOSIS — N40.1 BENIGN PROSTATIC HYPERPLASIA WITH LOWER URINARY TRACT SYMPTOMS: ICD-10-CM

## 2022-05-18 DIAGNOSIS — Z01.818 ENCOUNTER FOR OTHER PREPROCEDURAL EXAMINATION: ICD-10-CM

## 2022-05-18 PROCEDURE — 52648 LASER SURGERY OF PROSTATE: CPT

## 2022-05-18 DEVICE — HPS FIBER GREENLIGHT: Type: IMPLANTABLE DEVICE | Status: FUNCTIONAL

## 2022-05-18 RX ORDER — AMLODIPINE BESYLATE 2.5 MG/1
2.5 TABLET ORAL AT BEDTIME
Refills: 0 | Status: DISCONTINUED | OUTPATIENT
Start: 2022-05-18 | End: 2022-06-01

## 2022-05-18 RX ORDER — LABETALOL HCL 100 MG
2.5 TABLET ORAL ONCE
Refills: 0 | Status: COMPLETED | OUTPATIENT
Start: 2022-05-18 | End: 2022-05-18

## 2022-05-18 RX ORDER — LABETALOL HCL 100 MG
10 TABLET ORAL ONCE
Refills: 0 | Status: COMPLETED | OUTPATIENT
Start: 2022-05-18 | End: 2022-05-18

## 2022-05-18 RX ORDER — PANTOPRAZOLE SODIUM 20 MG/1
1 TABLET, DELAYED RELEASE ORAL
Qty: 0 | Refills: 0 | DISCHARGE

## 2022-05-18 RX ORDER — TELMISARTAN 20 MG/1
1 TABLET ORAL
Qty: 0 | Refills: 0 | DISCHARGE

## 2022-05-18 RX ORDER — CEVIMELINE 30 MG/1
1 CAPSULE ORAL
Qty: 0 | Refills: 0 | DISCHARGE

## 2022-05-18 RX ORDER — HYDROMORPHONE HYDROCHLORIDE 2 MG/ML
0.25 INJECTION INTRAMUSCULAR; INTRAVENOUS; SUBCUTANEOUS
Refills: 0 | Status: DISCONTINUED | OUTPATIENT
Start: 2022-05-18 | End: 2022-05-19

## 2022-05-18 RX ORDER — CEFAZOLIN SODIUM 1 G
1000 VIAL (EA) INJECTION EVERY 8 HOURS
Refills: 0 | Status: DISCONTINUED | OUTPATIENT
Start: 2022-05-18 | End: 2022-06-01

## 2022-05-18 RX ORDER — LOSARTAN POTASSIUM 100 MG/1
50 TABLET, FILM COATED ORAL DAILY
Refills: 0 | Status: DISCONTINUED | OUTPATIENT
Start: 2022-05-18 | End: 2022-06-01

## 2022-05-18 RX ORDER — PANTOPRAZOLE SODIUM 20 MG/1
40 TABLET, DELAYED RELEASE ORAL
Refills: 0 | Status: DISCONTINUED | OUTPATIENT
Start: 2022-05-18 | End: 2022-06-01

## 2022-05-18 RX ORDER — SODIUM CHLORIDE 9 MG/ML
3 INJECTION INTRAMUSCULAR; INTRAVENOUS; SUBCUTANEOUS EVERY 8 HOURS
Refills: 0 | Status: DISCONTINUED | OUTPATIENT
Start: 2022-05-18 | End: 2022-05-18

## 2022-05-18 RX ORDER — ONDANSETRON 8 MG/1
4 TABLET, FILM COATED ORAL EVERY 6 HOURS
Refills: 0 | Status: DISCONTINUED | OUTPATIENT
Start: 2022-05-18 | End: 2022-06-01

## 2022-05-18 RX ORDER — HEPARIN SODIUM 5000 [USP'U]/ML
5000 INJECTION INTRAVENOUS; SUBCUTANEOUS EVERY 8 HOURS
Refills: 0 | Status: DISCONTINUED | OUTPATIENT
Start: 2022-05-18 | End: 2022-06-01

## 2022-05-18 RX ORDER — ESZOPICLONE 2 MG/1
30 TABLET, COATED ORAL
Qty: 0 | Refills: 0 | DISCHARGE

## 2022-05-18 RX ORDER — FINASTERIDE 5 MG/1
5 TABLET, FILM COATED ORAL DAILY
Refills: 0 | Status: DISCONTINUED | OUTPATIENT
Start: 2022-05-18 | End: 2022-06-01

## 2022-05-18 RX ORDER — TAMSULOSIN HYDROCHLORIDE 0.4 MG/1
2 CAPSULE ORAL
Qty: 0 | Refills: 0 | DISCHARGE

## 2022-05-18 RX ORDER — TAMSULOSIN HYDROCHLORIDE 0.4 MG/1
0.8 CAPSULE ORAL AT BEDTIME
Refills: 0 | Status: DISCONTINUED | OUTPATIENT
Start: 2022-05-18 | End: 2022-06-01

## 2022-05-18 RX ORDER — SODIUM CHLORIDE 9 MG/ML
1000 INJECTION, SOLUTION INTRAVENOUS
Refills: 0 | Status: DISCONTINUED | OUTPATIENT
Start: 2022-05-18 | End: 2022-06-01

## 2022-05-18 RX ORDER — ROSUVASTATIN CALCIUM 5 MG/1
40 TABLET ORAL
Qty: 0 | Refills: 0 | DISCHARGE

## 2022-05-18 RX ORDER — ASPIRIN/CALCIUM CARB/MAGNESIUM 324 MG
1 TABLET ORAL
Qty: 0 | Refills: 0 | DISCHARGE

## 2022-05-18 RX ORDER — ATROPA BELLADONNA AND OPIUM 16.2; 6 MG/1; MG/1
1 SUPPOSITORY RECTAL EVERY 8 HOURS
Refills: 0 | Status: DISCONTINUED | OUTPATIENT
Start: 2022-05-18 | End: 2022-05-18

## 2022-05-18 RX ORDER — ONDANSETRON 8 MG/1
4 TABLET, FILM COATED ORAL ONCE
Refills: 0 | Status: DISCONTINUED | OUTPATIENT
Start: 2022-05-18 | End: 2022-05-19

## 2022-05-18 RX ORDER — METOPROLOL TARTRATE 50 MG
25 TABLET ORAL
Refills: 0 | Status: DISCONTINUED | OUTPATIENT
Start: 2022-05-18 | End: 2022-06-01

## 2022-05-18 RX ORDER — FINASTERIDE 5 MG/1
1 TABLET, FILM COATED ORAL
Qty: 0 | Refills: 0 | DISCHARGE

## 2022-05-18 RX ORDER — METOPROLOL TARTRATE 50 MG
25 TABLET ORAL
Qty: 0 | Refills: 0 | DISCHARGE

## 2022-05-18 RX ORDER — LIDOCAINE HCL 20 MG/ML
0.2 VIAL (ML) INJECTION ONCE
Refills: 0 | Status: DISCONTINUED | OUTPATIENT
Start: 2022-05-18 | End: 2022-05-18

## 2022-05-18 RX ORDER — SENNA PLUS 8.6 MG/1
1 TABLET ORAL AT BEDTIME
Refills: 0 | Status: DISCONTINUED | OUTPATIENT
Start: 2022-05-18 | End: 2022-06-01

## 2022-05-18 RX ORDER — ASPIRIN/CALCIUM CARB/MAGNESIUM 324 MG
81 TABLET ORAL DAILY
Refills: 0 | Status: DISCONTINUED | OUTPATIENT
Start: 2022-05-18 | End: 2022-06-01

## 2022-05-18 RX ORDER — ATORVASTATIN CALCIUM 80 MG/1
80 TABLET, FILM COATED ORAL AT BEDTIME
Refills: 0 | Status: DISCONTINUED | OUTPATIENT
Start: 2022-05-18 | End: 2022-06-01

## 2022-05-18 RX ORDER — DIAZEPAM 5 MG
2 TABLET ORAL ONCE
Refills: 0 | Status: DISCONTINUED | OUTPATIENT
Start: 2022-05-18 | End: 2022-05-18

## 2022-05-18 RX ADMIN — LOSARTAN POTASSIUM 50 MILLIGRAM(S): 100 TABLET, FILM COATED ORAL at 21:45

## 2022-05-18 RX ADMIN — FINASTERIDE 5 MILLIGRAM(S): 5 TABLET, FILM COATED ORAL at 18:09

## 2022-05-18 RX ADMIN — Medication 100 MILLIGRAM(S): at 21:45

## 2022-05-18 RX ADMIN — Medication 10 MILLIGRAM(S): at 21:15

## 2022-05-18 RX ADMIN — HEPARIN SODIUM 5000 UNIT(S): 5000 INJECTION INTRAVENOUS; SUBCUTANEOUS at 21:46

## 2022-05-18 RX ADMIN — HEPARIN SODIUM 5000 UNIT(S): 5000 INJECTION INTRAVENOUS; SUBCUTANEOUS at 14:37

## 2022-05-18 RX ADMIN — Medication 2.5 MILLIGRAM(S): at 10:57

## 2022-05-18 RX ADMIN — ATORVASTATIN CALCIUM 80 MILLIGRAM(S): 80 TABLET, FILM COATED ORAL at 21:39

## 2022-05-18 RX ADMIN — Medication 25 MILLIGRAM(S): at 16:18

## 2022-05-18 RX ADMIN — Medication 2 MILLIGRAM(S): at 20:55

## 2022-05-18 RX ADMIN — TAMSULOSIN HYDROCHLORIDE 0.8 MILLIGRAM(S): 0.4 CAPSULE ORAL at 19:11

## 2022-05-18 RX ADMIN — SODIUM CHLORIDE 125 MILLILITER(S): 9 INJECTION, SOLUTION INTRAVENOUS at 10:27

## 2022-05-18 RX ADMIN — AMLODIPINE BESYLATE 2.5 MILLIGRAM(S): 2.5 TABLET ORAL at 19:11

## 2022-05-18 RX ADMIN — SODIUM CHLORIDE 125 MILLILITER(S): 9 INJECTION, SOLUTION INTRAVENOUS at 22:00

## 2022-05-18 RX ADMIN — Medication 81 MILLIGRAM(S): at 13:08

## 2022-05-18 NOTE — ASU PATIENT PROFILE, ADULT - NSICDXPASTSURGICALHX_GEN_ALL_CORE_FT
PAST SURGICAL HISTORY:  H/O carpal tunnel repair left    History of back surgery herniated  disc    History of nasal surgery 2007

## 2022-05-18 NOTE — PRE-ANESTHESIA EVALUATION ADULT - LAST ECHOCARDIOGRAM
A PA is no longer needed for this medication. The PA was required if the patient wanted to get brand (Valtrex). The pharmacy confirmed that the patient picked up the generic.    Prior Authorization Not Needed    Medication: valACYclovir (VALTREX) 500 MG tablet - PA NOT NEEDED   Insurance Company: [employer] - Phone [phone] Fax [phone]  Expected CoPay:      Pharmacy Filling the Rx: [facility] [id number] [place] [address] [id number] AT [place] [address] & [facility]  Pharmacy Notified: Yes  Patient Notified: Yes          
Prior Authorization Specialty Medication Request    Medication/Dose: valtrex 500 mg tablets   ICD code (if different than what is on RX):    Previously Tried and Failed:      Important Lab Values:   Rationale:     Insurance Name:   Insurance ID:   Insurance Phone Number:     Pharmacy Information (if different than what is on RX)  Name:    Phone:              
Echo 8/10 - EF = 65%

## 2022-05-18 NOTE — PRE-ANESTHESIA EVALUATION ADULT - NSANTHADDINFOFT_GEN_ALL_CORE
ENT clearance / evaluation read and appreciated. ENT scope without evidence of recurrence. Medical clearance read and appreciated.

## 2022-05-18 NOTE — ASU PATIENT PROFILE, ADULT - NSICDXPASTMEDICALHX_GEN_ALL_CORE_FT
PAST MEDICAL HISTORY:  BPH (benign prostatic hyperplasia)     Carcinoma of nasopharyngeal wall Dx in 2010, s/o chemo and radio therapy, in remission.    History of TIAs 2019- on aspirin daily. No deficits    HTN (hypertension)     Hypercholesteremia

## 2022-05-19 ENCOUNTER — TRANSCRIPTION ENCOUNTER (OUTPATIENT)
Age: 72
End: 2022-05-19

## 2022-05-19 VITALS
SYSTOLIC BLOOD PRESSURE: 165 MMHG | HEART RATE: 88 BPM | RESPIRATION RATE: 18 BRPM | DIASTOLIC BLOOD PRESSURE: 80 MMHG | OXYGEN SATURATION: 99 %

## 2022-05-19 LAB
ANION GAP SERPL CALC-SCNC: 10 MMOL/L — SIGNIFICANT CHANGE UP (ref 5–17)
BUN SERPL-MCNC: 12 MG/DL — SIGNIFICANT CHANGE UP (ref 7–23)
CALCIUM SERPL-MCNC: 9.2 MG/DL — SIGNIFICANT CHANGE UP (ref 8.4–10.5)
CHLORIDE SERPL-SCNC: 107 MMOL/L — SIGNIFICANT CHANGE UP (ref 96–108)
CO2 SERPL-SCNC: 24 MMOL/L — SIGNIFICANT CHANGE UP (ref 22–31)
CREAT SERPL-MCNC: 0.79 MG/DL — SIGNIFICANT CHANGE UP (ref 0.5–1.3)
EGFR: 94 ML/MIN/1.73M2 — SIGNIFICANT CHANGE UP
GLUCOSE SERPL-MCNC: 112 MG/DL — HIGH (ref 70–99)
HCT VFR BLD CALC: 40.3 % — SIGNIFICANT CHANGE UP (ref 39–50)
HGB BLD-MCNC: 13.5 G/DL — SIGNIFICANT CHANGE UP (ref 13–17)
MCHC RBC-ENTMCNC: 30.5 PG — SIGNIFICANT CHANGE UP (ref 27–34)
MCHC RBC-ENTMCNC: 33.5 GM/DL — SIGNIFICANT CHANGE UP (ref 32–36)
MCV RBC AUTO: 91.2 FL — SIGNIFICANT CHANGE UP (ref 80–100)
NRBC # BLD: 0 /100 WBCS — SIGNIFICANT CHANGE UP (ref 0–0)
PLATELET # BLD AUTO: 190 K/UL — SIGNIFICANT CHANGE UP (ref 150–400)
POTASSIUM SERPL-MCNC: 4.1 MMOL/L — SIGNIFICANT CHANGE UP (ref 3.5–5.3)
POTASSIUM SERPL-SCNC: 4.1 MMOL/L — SIGNIFICANT CHANGE UP (ref 3.5–5.3)
RBC # BLD: 4.42 M/UL — SIGNIFICANT CHANGE UP (ref 4.2–5.8)
RBC # FLD: 12.5 % — SIGNIFICANT CHANGE UP (ref 10.3–14.5)
SODIUM SERPL-SCNC: 141 MMOL/L — SIGNIFICANT CHANGE UP (ref 135–145)
WBC # BLD: 13.93 K/UL — HIGH (ref 3.8–10.5)
WBC # FLD AUTO: 13.93 K/UL — HIGH (ref 3.8–10.5)

## 2022-05-19 PROCEDURE — 52648 LASER SURGERY OF PROSTATE: CPT

## 2022-05-19 PROCEDURE — C1889: CPT

## 2022-05-19 PROCEDURE — 86901 BLOOD TYPING SEROLOGIC RH(D): CPT

## 2022-05-19 PROCEDURE — 80048 BASIC METABOLIC PNL TOTAL CA: CPT

## 2022-05-19 PROCEDURE — 86900 BLOOD TYPING SEROLOGIC ABO: CPT

## 2022-05-19 PROCEDURE — 86850 RBC ANTIBODY SCREEN: CPT

## 2022-05-19 PROCEDURE — 85027 COMPLETE CBC AUTOMATED: CPT

## 2022-05-19 RX ORDER — CEPHALEXIN 500 MG
1 CAPSULE ORAL
Qty: 12 | Refills: 0
Start: 2022-05-19 | End: 2022-05-21

## 2022-05-19 RX ORDER — MIRABEGRON 50 MG/1
1 TABLET, EXTENDED RELEASE ORAL
Qty: 0 | Refills: 0 | DISCHARGE

## 2022-05-19 RX ORDER — SENNA PLUS 8.6 MG/1
1 TABLET ORAL
Qty: 0 | Refills: 0 | DISCHARGE
Start: 2022-05-19

## 2022-05-19 RX ADMIN — LOSARTAN POTASSIUM 50 MILLIGRAM(S): 100 TABLET, FILM COATED ORAL at 06:34

## 2022-05-19 RX ADMIN — Medication 25 MILLIGRAM(S): at 06:19

## 2022-05-19 RX ADMIN — HEPARIN SODIUM 5000 UNIT(S): 5000 INJECTION INTRAVENOUS; SUBCUTANEOUS at 06:21

## 2022-05-19 RX ADMIN — HYDROMORPHONE HYDROCHLORIDE 0.25 MILLIGRAM(S): 2 INJECTION INTRAMUSCULAR; INTRAVENOUS; SUBCUTANEOUS at 01:00

## 2022-05-19 RX ADMIN — Medication 100 MILLIGRAM(S): at 06:20

## 2022-05-19 RX ADMIN — HYDROMORPHONE HYDROCHLORIDE 0.25 MILLIGRAM(S): 2 INJECTION INTRAMUSCULAR; INTRAVENOUS; SUBCUTANEOUS at 01:15

## 2022-05-19 NOTE — ASU DISCHARGE PLAN (ADULT/PEDIATRIC) - NS MD DC FALL RISK RISK
For information on Fall & Injury Prevention, visit: [email or web address] OR  [email or web address] OR  https://www.cdc.gov/steadi/patient.html

## 2022-05-19 NOTE — PROGRESS NOTE ADULT - SUBJECTIVE AND OBJECTIVE BOX
Subjective Pt has insomnia otherwise uneventful     Objective CBI held this     Vital signs  T(F): , Max: 97.9 (05-18-22 @ 09:15)  HR: 100 (05-19-22 @ 06:00)  BP: 180/91 (05-19-22 @ 06:00)  SpO2: 98% (05-19-22 @ 06:00)  Wt(kg): --    Output     05-18 @ 07:01  -  05-19 @ 07:00  --------------------------------------------------------  IN: 1200 mL / OUT: 0 mL / NET: 1200 mL        Gen NAD Anxious  Abd soft NTND   CBI held garcia light pink urine removed for TOV     Labs      05-19 @ 06:16    WBC 13.93 / Hct 40.3  / SCr --       05-19 @ 06:15    WBC --    / Hct --    / SCr 0.79       Urine Cx: ?  Blood Cx: ?    Imaging
 Post op Check    Pt seen and examined without complaints. Pain is controlled. Denies SOB/CP/N/V.     Vital Signs Last 24 Hrs  T(C): 36.5 (18 May 2022 12:00), Max: 36.7 (18 May 2022 05:20)  T(F): 97.7 (18 May 2022 12:00), Max: 98.1 (18 May 2022 05:20)  HR: 92 (18 May 2022 12:00) (76 - 92)  BP: 142/59 (18 May 2022 12:00) (128/81 - 149/91)  BP(mean): 81 (18 May 2022 12:00) (81 - 114)  RR: 14 (18 May 2022 12:00) (12 - 16)  SpO2: 98% (18 May 2022 12:00) (97% - 100%)    I&O's Summary      Physical Exam  Gen: NAD  Pulm: No respiratory distress, no subcostal retractions  CV: RRR, no JVD  Abd: Soft, NT, ND  : CBI- clear                 A/P: 71y Male s/p greenlight prostate   CBI   Galeano   DVT prophylaxis/OOB  Incentive spirometry  Strict I&O's  Analgesia and antiemetics as needed  Diet  TOV in am if clear

## 2022-05-19 NOTE — PROGRESS NOTE ADULT - ASSESSMENT
70 yo male s/p greenlight TURP POD #1   CBI ran overnight to light pink   garcia removed   TOV  assess PVR  DC planning

## 2022-05-25 ENCOUNTER — APPOINTMENT (OUTPATIENT)
Dept: UROLOGY | Facility: CLINIC | Age: 72
End: 2022-05-25
Payer: MEDICARE

## 2022-05-25 VITALS
RESPIRATION RATE: 17 BRPM | SYSTOLIC BLOOD PRESSURE: 136 MMHG | DIASTOLIC BLOOD PRESSURE: 78 MMHG | HEART RATE: 77 BPM | WEIGHT: 164 LBS | HEIGHT: 70 IN | TEMPERATURE: 98.3 F | BODY MASS INDEX: 23.48 KG/M2

## 2022-05-25 PROCEDURE — 99024 POSTOP FOLLOW-UP VISIT: CPT

## 2022-05-25 NOTE — LETTER BODY
[FreeTextEntry1] : Ambrose León MD\par [address] , \[place] [address]\par [phone]\par \par Dear Dr. Mcfarland,\par \par Reason for visit: Followup after Greenlight laser vaporization of prostate\par \par This is a 71 year old gentleman with BPH. Patient underwent Greenlight laser vaporization of prostate last Wednesday and returns today for follow-up. Patient denies any changes in health.  Patient continues to take Flomax, Avodart, and Myrbetriq.  He complains of frequent urination after this procedure.  This was present prior to surgery.  Patient does report strong urine flow which is improved.  The past medical history and family history and social history are unchanged. All other review of systems are negative. Patient denies any changes in medications. Medication list was reconciled.\par \par On examination, the patient is in no acute distress. He is alert and oriented follows commands. He has normal mood and affect. He is normocephalic. Oral no thrush. Neck is supple. Respirations are unlabored. His abdomen is soft and nontender. Liver is nonpalpable. Bladder is nonpalpable. No CVA tenderness. Neurologically he is grossly intact. No peripheral edema. Skin without gross abnormality. He has normal male external genitalia. Normal meatus.\par \par PVR was obtained because of his urinary symptoms today.  PVR today was 40 cc.\par \par Assessment: BPH. Status post Greenlight laser vaporization of prostate\par \par I counseled the patient. I encouraged hydration.  I reassured the patient.  I encouraged him to continue both Flomax, Avodart, and Myrbetriq.  We will follow him in 1 month.  He'll continue his medical therapy for his prostate. Risks and alternatives were discussed. I answered the patient questions. The patient will follow-up as directed and will contact me with any questions or concerns.  Thank you for the opportunity to participate in the care of this patient. I'll keep you updated on his  progress.\par \par Plan: Followup 1 month

## 2022-06-01 ENCOUNTER — NON-APPOINTMENT (OUTPATIENT)
Age: 72
End: 2022-06-01

## 2022-06-02 ENCOUNTER — APPOINTMENT (OUTPATIENT)
Dept: UROLOGY | Facility: CLINIC | Age: 72
End: 2022-06-02
Payer: MEDICARE

## 2022-06-02 DIAGNOSIS — R04.1 HEMORRHAGE FROM THROAT: ICD-10-CM

## 2022-06-02 PROCEDURE — 99024 POSTOP FOLLOW-UP VISIT: CPT

## 2022-06-02 PROCEDURE — 51798 US URINE CAPACITY MEASURE: CPT

## 2022-06-02 NOTE — LETTER BODY
[FreeTextEntry1] : Ambrose León MD\par [address] , \[place] [address]\par [phone]\par \par Dear Dr. Mcfarland,\par \par Reason for visit: BPH s/p GreenLight laser vaporization of prostate. Urinary frequency. \par \par This is a 71 year old gentleman with BPH status post GreenLight laser vaporization of prostate on May 18. The patient returns today for follow-up. Since he was last seen, the patient continues to take Flomax, Avodart, and Myrbetriq 50 mg. He notes good uroflow. However, he complains of increased urinary frequency and urgency.  He denies any dysuria, hematuria or urinary incontinence. The patient denies any changes in health. The past medical history and family history and social history are unchanged. All other review of systems are negative. Patient denies any changes in medications. Medication list was reconciled.\par \par On examination, the patient is a healthy-appearing gentleman in no acute distress. He is alert and oriented follows commands. He has normal mood and affect. He is normocephalic. Oral no thrush. Neck is supple. Respirations are unlabored. His abdomen is soft and nontender. Liver is nonpalpable. Bladder is nonpalpable. No CVA tenderness. Neurologically he is grossly intact. No peripheral edema. Skin without gross abnormality. \par \par Post-void residual on bladder scan today was 100 cc. \par \par Assessment: BPH s/p GreenLight laser vaporization of prostate. Urinary frequency. \par \par I counseled the patient. His PVR was 100 cc. In terms of his BPH, the patient reports good uroflow. However, he complains of increased urinary frequency. I recommended the patient begin a trial of Trospium. I discussed the potential side effects of the medication. I counseled the patient on its use and side effects. If the patient develops any side effects, the patient will discontinue the medication and contact me. I also  recommended he continue taking Flomax, Avodart, and Myrbetriq. He will obtain urinalysis and urine culture to evaluate for infection.  Risks and alternatives were discussed. I answered the patient questions. The patient will follow-up as directed and will contact me with any questions or concerns. Thank you for the opportunity to participate in the care of this patient. I'll keep you updated on his progress.\par \par Plan: Trial of Trospium. Continue Flomax, Avodart, and Myrbetriq 50 mg. Urinalysis. Urine culture. Follow up 1 month.

## 2022-06-02 NOTE — HISTORY OF PRESENT ILLNESS
[FreeTextEntry1] : Please refer to URO Consult note \par \par Follow-up BPH status post PVP.  Patient has increased urinary urgency and frequency.  He has taken Myrbetriq at baseline.  PVR today was 100 cc.  Trospium.  Follow-up 1 month.\par \par Urine culture.

## 2022-06-05 LAB
APPEARANCE: CLEAR
BACTERIA UR CULT: NORMAL
BACTERIA: NEGATIVE
BILIRUBIN URINE: NEGATIVE
BLOOD URINE: ABNORMAL
COLOR: COLORLESS
GLUCOSE QUALITATIVE U: NEGATIVE
HYALINE CASTS: 1 /LPF
KETONES URINE: NEGATIVE
LEUKOCYTE ESTERASE URINE: NEGATIVE
MICROSCOPIC-UA: NORMAL
NITRITE URINE: NEGATIVE
PH URINE: 6.5
PROTEIN URINE: NORMAL
RED BLOOD CELLS URINE: 7 /HPF
SPECIFIC GRAVITY URINE: 1
SQUAMOUS EPITHELIAL CELLS: 0 /HPF
UROBILINOGEN URINE: NORMAL
WHITE BLOOD CELLS URINE: 3 /HPF

## 2022-06-07 ENCOUNTER — APPOINTMENT (OUTPATIENT)
Dept: UROLOGY | Facility: CLINIC | Age: 72
End: 2022-06-07

## 2022-06-13 ENCOUNTER — TRANSCRIPTION ENCOUNTER (OUTPATIENT)
Age: 72
End: 2022-06-13

## 2022-06-13 RX ORDER — TROSPIUM CHLORIDE 20 MG/1
20 TABLET, FILM COATED ORAL
Qty: 30 | Refills: 3 | Status: COMPLETED | COMMUNITY
Start: 2022-06-02 | End: 2022-06-13

## 2022-06-25 NOTE — H&P ADULT - NSHPREVIEWOFSYSTEMS_GEN_ALL_CORE
REVIEW OF SYSTEMS:    CONSTITUTIONAL: No weakness, fevers or chills  EYES/ENT: No visual changes;  No vertigo or throat pain   NECK: No pain or stiffness  RESPIRATORY: No cough, wheezing,  HAS  hemoptysis; No shortness of breath  CARDIOVASCULAR: No chest pain or palpitations  GASTROINTESTINAL: No abdominal or epigastric pain. No nausea, vomiting, or hematemesis; No diarrhea or constipation. No melena or hematochezia.  GENITOURINARY: No dysuria, frequency or hematuria  NEUROLOGICAL: No numbness or weakness  SKIN: No itching, rashes
Speaking Coherently

## 2022-06-30 ENCOUNTER — APPOINTMENT (OUTPATIENT)
Dept: UROLOGY | Facility: CLINIC | Age: 72
End: 2022-06-30

## 2022-06-30 PROCEDURE — 99024 POSTOP FOLLOW-UP VISIT: CPT

## 2022-07-03 NOTE — LETTER BODY
[FreeTextEntry1] : Ambrose León MD\par [address] , \[place] [address]\par [phone]\par \par Dear Dr. Mcfarland,\par \par Reason for visit: BPH s/p GreenLight laser vaporization of prostate. Urinary frequency. \par \par This is a 71 year old gentleman with BPH status post GreenLight laser vaporization of prostate on May 18. The patient returns today for follow-up. Since he was last seen, the patient continues to take Flomax, Avodart, Trospium and Myrbetriq 50 mg. He notes good uroflow and improved urinary symptoms on medical therapy. He denies any dysuria, hematuria or urinary incontinence. The patient denies any changes in health. The past medical history and family history and social history are unchanged. All other review of systems are negative. Patient denies any changes in medications. Medication list was reconciled.\par \par On examination, the patient is a healthy-appearing gentleman in no acute distress. He is alert and oriented follows commands. He has normal mood and affect. He is normocephalic. Oral no thrush. Neck is supple. Respirations are unlabored. His abdomen is soft and nontender. Liver is nonpalpable. Bladder is nonpalpable. No CVA tenderness. Neurologically he is grossly intact. No peripheral edema. Skin without gross abnormality. \par \par His urinalysis demonstrated evidence of microscopic hematuria, 7 RBC/HPF. His urine culture was negative. \par \par Post-void residual on bladder scan today was 20 cc. \par \par Assessment: BPH s/p GreenLight laser vaporization of prostate. Urinary frequency. \par \par I counseled the patient. His PVR was 20 cc. In terms of his BPH, the patient reports good uroflow since the surgery. I recommended he discontinue Flomax and Avodart. In terms of his urinary frequency, the patient reports improved urinary symptom on medical therapy. I recommended he continues taking Myrbetriq 50 mg and Trospium regularly as directed. He will follow up in 3 months to ensure stability. Risks and alternatives were discussed. I answered the patient questions. The patient will follow-up as directed and will contact me with any questions or concerns. Thank you for the opportunity to participate in the care of this patient. I'll keep you updated on his progress.\par \par Plan: DC Flomax and Avodart. Continue Trospium and Myrbetriq 50 mg. Follow up in 3 months.

## 2022-10-19 ENCOUNTER — APPOINTMENT (OUTPATIENT)
Dept: OTOLARYNGOLOGY | Facility: CLINIC | Age: 72
End: 2022-10-19

## 2022-10-19 DIAGNOSIS — C11.9 MALIGNANT NEOPLASM OF NASOPHARYNX, UNSPECIFIED: ICD-10-CM

## 2022-10-19 PROCEDURE — 99213 OFFICE O/P EST LOW 20 MIN: CPT | Mod: 25

## 2022-10-19 PROCEDURE — 31575 DIAGNOSTIC LARYNGOSCOPY: CPT

## 2022-10-25 ENCOUNTER — OUTPATIENT (OUTPATIENT)
Dept: OUTPATIENT SERVICES | Facility: HOSPITAL | Age: 72
LOS: 1 days | End: 2022-10-25
Payer: MEDICARE

## 2022-10-25 ENCOUNTER — APPOINTMENT (OUTPATIENT)
Dept: CT IMAGING | Facility: HOSPITAL | Age: 72
End: 2022-10-25

## 2022-10-25 DIAGNOSIS — Z98.890 OTHER SPECIFIED POSTPROCEDURAL STATES: Chronic | ICD-10-CM

## 2022-10-25 LAB — POCT ISTAT CREATININE: 0.9 MG/DL — SIGNIFICANT CHANGE UP (ref 0.5–1.3)

## 2022-10-25 PROCEDURE — 82565 ASSAY OF CREATININE: CPT

## 2022-10-25 PROCEDURE — 70491 CT SOFT TISSUE NECK W/DYE: CPT | Mod: 26,MH

## 2022-10-25 PROCEDURE — 70491 CT SOFT TISSUE NECK W/DYE: CPT

## 2022-10-27 ENCOUNTER — TRANSCRIPTION ENCOUNTER (OUTPATIENT)
Age: 72
End: 2022-10-27

## 2022-10-28 ENCOUNTER — TRANSCRIPTION ENCOUNTER (OUTPATIENT)
Age: 72
End: 2022-10-28

## 2022-11-29 ENCOUNTER — NON-APPOINTMENT (OUTPATIENT)
Age: 72
End: 2022-11-29

## 2022-11-29 ENCOUNTER — APPOINTMENT (OUTPATIENT)
Dept: OPHTHALMOLOGY | Facility: CLINIC | Age: 72
End: 2022-11-29

## 2022-11-29 PROCEDURE — 92020 GONIOSCOPY: CPT

## 2022-11-29 PROCEDURE — 92004 COMPRE OPH EXAM NEW PT 1/>: CPT

## 2022-11-29 PROCEDURE — 92250 FUNDUS PHOTOGRAPHY W/I&R: CPT

## 2022-12-09 ENCOUNTER — NON-APPOINTMENT (OUTPATIENT)
Age: 72
End: 2022-12-09

## 2022-12-09 ENCOUNTER — APPOINTMENT (OUTPATIENT)
Dept: OPHTHALMOLOGY | Facility: CLINIC | Age: 72
End: 2022-12-09

## 2022-12-09 PROCEDURE — 92083 EXTENDED VISUAL FIELD XM: CPT

## 2022-12-09 PROCEDURE — 92133 CPTRZD OPH DX IMG PST SGM ON: CPT

## 2022-12-09 PROCEDURE — 99214 OFFICE O/P EST MOD 30 MIN: CPT

## 2022-12-12 ENCOUNTER — APPOINTMENT (OUTPATIENT)
Dept: OPHTHALMOLOGY | Facility: CLINIC | Age: 72
End: 2022-12-12

## 2023-01-17 ENCOUNTER — APPOINTMENT (OUTPATIENT)
Dept: NEUROLOGY | Facility: CLINIC | Age: 73
End: 2023-01-17
Payer: MEDICARE

## 2023-01-17 VITALS
DIASTOLIC BLOOD PRESSURE: 91 MMHG | WEIGHT: 158 LBS | BODY MASS INDEX: 22.62 KG/M2 | HEIGHT: 70 IN | SYSTOLIC BLOOD PRESSURE: 147 MMHG | HEART RATE: 78 BPM

## 2023-01-17 DIAGNOSIS — Z86.73 PERSONAL HISTORY OF TRANSIENT ISCHEMIC ATTACK (TIA), AND CEREBRAL INFARCTION W/OUT RESIDUAL DEFICITS: ICD-10-CM

## 2023-01-17 DIAGNOSIS — R68.84 JAW PAIN: ICD-10-CM

## 2023-01-17 DIAGNOSIS — I65.29 OCCLUSION AND STENOSIS OF UNSPECIFIED CAROTID ARTERY: ICD-10-CM

## 2023-01-17 PROCEDURE — 99205 OFFICE O/P NEW HI 60 MIN: CPT

## 2023-01-17 RX ORDER — ROSUVASTATIN CALCIUM 40 MG/1
40 TABLET, FILM COATED ORAL
Refills: 0 | Status: ACTIVE | COMMUNITY

## 2023-01-17 NOTE — PHYSICAL EXAM

## 2023-01-17 NOTE — DISCUSSION/SUMMARY
[FreeTextEntry1] : Mr. Jackson is a 72 year old man with a PMHx of HTN, HLD, nasopharyngeal cancer s/p RT, who comes in today for a second neurological opinion. We discussed based on his symptoms and complaints of jaw pain/locking are not consistent with TIAs. I am not sure what his diagnosis is, but this may be due to muschle/nerve changes related to past XRT. I refered him to general neurology to complete his work up. We discussed even if he was having TIAs he is on the appropriate medication regimen in preventing a stroke. He will follow up as needed. All of his questions and concerns were addressed.

## 2023-01-17 NOTE — HISTORY OF PRESENT ILLNESS
[FreeTextEntry1] : Mr. Jackson is a 72 year old man with a PMHx of HTN, HLD, nasopharyngeal cancer s/p RT, TIA who comes in today for a second neurological opinion. He reports since 2018, a few times a year his jaw locks while eating, denies any weakness or vision problems during these episodes. He was seen by neurologist, Dr. Remedios Lai, had an MRI which showed no acute infarct. He was seen by Dr. Douglas who believes he has GCA, ordered him steroids and recommended a temporal biopsy, neither was done as his PCP told him it was not necessary because his ESR was only 15. He is on ASA and Crestor for stroke prevention in the setting of asymptomatic carotid stenosis as seen on Carotid Doppler. No imaging was brought in, only reports for me to review.

## 2023-01-17 NOTE — REVIEW OF SYSTEMS
[Fever] : no fever [Chills] : no chills [Feeling Poorly] : not feeling poorly [Feeling Tired] : not feeling tired [Suicidal] : not suicidal [Anxiety] : no anxiety [Depression] : no depression [Confused or Disoriented] : no confusion [Memory Lapses or Loss] : no memory loss [Decr. Concentrating Ability] : no decrease in concentrating ability [Difficulty with Language] : no ~M difficulty with language [Changed Thought Patterns] : no change in thought patterns [Repeating Questions] : no repeated questioning about recent events [Facial Weakness] : no facial weakness [Arm Weakness] : no arm weakness [Hand Weakness] : no hand weakness [Leg Weakness] : no leg weakness [Poor Coordination] : good coordination [Difficulty Writing] : no difficulty writing [Difficulties in Speech] : no speech difficulties [Numbness] : no numbness [Tingling] : no tingling [Seizures] : no convulsions [Dizziness] : no dizziness [Fainting] : no fainting [Lightheadedness] : no lightheadedness [Vertigo] : no vertigo [Tension Headache] : no tension-type headache [Difficulty Walking] : no difficulty walking [Eyesight Problems] : no eyesight problems [Loss Of Hearing] : no hearing loss [Chest Pain] : no chest pain [Palpitations] : no palpitations [Shortness Of Breath] : no shortness of breath [Wheezing] : no wheezing [Vomiting] : no vomiting [Incontinence] : no incontinence [Joint Pain] : no joint pain [Easy Bleeding] : no tendency for easy bleeding [Easy Bruising] : no tendency for easy bruising

## 2023-02-09 ENCOUNTER — APPOINTMENT (OUTPATIENT)
Dept: INFECTIOUS DISEASE | Facility: CLINIC | Age: 73
End: 2023-02-09
Payer: MEDICARE

## 2023-02-09 ENCOUNTER — NON-APPOINTMENT (OUTPATIENT)
Age: 73
End: 2023-02-09

## 2023-02-09 VITALS
TEMPERATURE: 97.7 F | BODY MASS INDEX: 22.9 KG/M2 | HEART RATE: 83 BPM | SYSTOLIC BLOOD PRESSURE: 153 MMHG | WEIGHT: 160 LBS | HEIGHT: 70 IN | OXYGEN SATURATION: 98 % | DIASTOLIC BLOOD PRESSURE: 83 MMHG

## 2023-02-09 PROCEDURE — 99205 OFFICE O/P NEW HI 60 MIN: CPT

## 2023-02-11 LAB
DEPRECATED KAPPA LC FREE/LAMBDA SER: 1.52 RATIO
HIV1+2 AB SPEC QL IA.RAPID: NONREACTIVE
IGA SER QL IEP: 163 MG/DL
IGE SER-MCNC: 84 KU/L
IGG SER QL IEP: 1328 MG/DL
IGM SER QL IEP: 65 MG/DL
KAPPA LC CSF-MCNC: 2.15 MG/DL
KAPPA LC SERPL-MCNC: 3.26 MG/DL

## 2023-02-13 ENCOUNTER — LABORATORY RESULT (OUTPATIENT)
Age: 73
End: 2023-02-13

## 2023-02-14 LAB
IGG SUBSET TOTAL IGG: 1236 MG/DL
IGG1 SER-MCNC: 742 MG/DL
IGG2 SER-MCNC: 229 MG/DL
IGG3 SER-MCNC: 66 MG/DL
IGG4 SER-MCNC: 71 MG/DL

## 2023-02-15 ENCOUNTER — TRANSCRIPTION ENCOUNTER (OUTPATIENT)
Age: 73
End: 2023-02-15

## 2023-02-16 ENCOUNTER — TRANSCRIPTION ENCOUNTER (OUTPATIENT)
Age: 73
End: 2023-02-16

## 2023-02-16 DIAGNOSIS — R05.9 COUGH, UNSPECIFIED: ICD-10-CM

## 2023-02-17 ENCOUNTER — TRANSCRIPTION ENCOUNTER (OUTPATIENT)
Age: 73
End: 2023-02-17

## 2023-02-17 ENCOUNTER — NON-APPOINTMENT (OUTPATIENT)
Age: 73
End: 2023-02-17

## 2023-02-17 LAB — BACTERIA SPT CULT: ABNORMAL

## 2023-02-27 ENCOUNTER — TRANSCRIPTION ENCOUNTER (OUTPATIENT)
Age: 73
End: 2023-02-27

## 2023-02-27 LAB
ANNOTATION COMMENT IMP: NORMAL
CFTR MUT TESTED BLD/T: NEGATIVE
ELECTRONIC SIGNATURE: NORMAL
SERPINA1 GENE MUT TESTED BLD/T: NORMAL

## 2023-03-01 ENCOUNTER — TRANSCRIPTION ENCOUNTER (OUTPATIENT)
Age: 73
End: 2023-03-01

## 2023-03-06 ENCOUNTER — TRANSCRIPTION ENCOUNTER (OUTPATIENT)
Age: 73
End: 2023-03-06

## 2023-03-15 LAB — FUNGUS SPT CULT: ABNORMAL

## 2023-03-15 NOTE — DATA REVIEWED
[FreeTextEntry1] : 8/2017 CHEST CT:\par LUNGS AND LARGE AIRWAYS: Small amount of debris in the upper trachea. Mild bronchiectasis most predominant in the right middle lobe.  Mucoid impaction is noted. Scattered areas of groundglass and nodular opacities in the right upper upper and middle lobe and diffuse scattered areas of tree-in-bud opacities. Two 4 mm nodules in the right lower lobe.\par IMPRESSION: Scattered areas of groundglass and nodular opacities most prominent in the right upper and middle lobes with scattered diffuse tree-in-bud opacities. This is indeterminate and may be infectious/inflammatory. Superimposed right upper lobe part products are consideration. Given right middle lobe bronchiectasis, LUIS should be considered. 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  Since exam on April 4, 2013, there has been worsening of lung disease and multiple new hypermetabolic lung nodules as described above. Dedicated CT chest and/or biopsy may be of clinical value. The exact nature of the \par lung disease is unclear from the available information.  New focus of hypermetabolism in the rectum with wall thickening, may represent normal bowel activity, however endoscopic visualization is advised if has not been recently performed.\par \par December 8, 2022 \par CT CHEST from [facility]\par Mixed changes in numerous bilateral lung nodules.\par Impression: Since November 16, 2021, mixed changes in numerous b/l pulmonary nodular and branching opacities, probably represents waxing and waning infectious/inflammatory processes.  Increased cavitated nodule with peripheral solid component at right lower lobe, probably infectious/inflammatory or neoplastic.  Attention on f/u is suggested. Unchanged borderline subcarinal LN\par \par \par [facility]:\par 12/9/2022\par Sputum AFB smear negative\par Result of DNA probe +MAC\par not clear if it grew or not based on paper copy of result\par \par   \par \par \par \par \par \par \par

## 2023-03-15 NOTE — HISTORY OF PRESENT ILLNESS
[FreeTextEntry1] : 72M hx nasopharygneal cancer 2010 treated at [facility] in [facility].  For 4 months had chemotherapy and radiation. Was stage 4A.  Had f/u imaging and PET scans for surveillance.  At one point, PET scan noted "spots" in the lung 2011.  Referred to [facility] and saw Dr. Young.  Saw him annually and had annual CT chest scans.  Was told he had old pneumonia.  \par \par Back in 2003, he developed night sweats and saw an ID physician at [place] who did a "sputum test"  that showed possible MAC infection.  He was placed on 2 antibiotics for a couple of months.  He did not tolerate it with severe diarrhea.  \par \par 2017 or 2018, he had an episode of hemoptysis.  He continued to see Dr. Young.  At one point, he was offered a bronchoscopy, however, the patient refused.  Annual imaging at [facility] done showed bronchiectasis and nodules that were stable.  \par \par He had another episode of hemoptysis in December 2022.  CT done in 2022 showed mixed changes in numerous b/l pulmonary nodular and branching opacities, probably represents waxing and waning infectious/inflammatory processes.  Increased cavitated nodule with peripheral solid component at right lower lobe, probably infectious/inflammatory or neoplastic.  Unchanged borderline subcarinal LN.  Also, he saw Head/Neck surgeon at [facility] ( ) and every thing was okay.  Single sputum was sent to question.  AFB stain was negative.  Not clear if it grew or not, however, molecular testing was +MAC.\par \par He was referred to ID.  He lost about 10 pounds over the past 2 years.  His appetite is poor.  No SOB.  No CP.  No VIRK.  Really does not even cough.  Very occasional cough with clear sputum.  No N/V/D. 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

## 2023-03-15 NOTE — ADDENDUM
[FreeTextEntry1] : 3/15/2023 - Reviewed CT from Nov 2021, Oct 2022, Dec 2022 with Chest radiology\par no real change in cavitary lesion.\par \par Since then, a new CT from 2/14/2023 was obtained but not yet uploaded into PACS.  Will need to review with radiology once up loaded

## 2023-03-15 NOTE — PHYSICAL EXAM
[General Appearance - In No Acute Distress] : in no acute distress [Sclera] : the sclera and conjunctiva were normal [Outer Ear] : the ears and nose were normal in appearance [Neck Appearance] : the appearance of the neck was normal [Auscultation Breath Sounds / Voice Sounds] : lungs were clear to auscultation bilaterally [Heart Sounds] : normal S1 and S2 [Edema] : there was no peripheral edema [No Palpable Adenopathy] : no palpable adenopathy [Bowel Sounds] : normal bowel sounds [Musculoskeletal - Swelling] : no joint swelling [] : no rash [No Focal Deficits] : no focal deficits [Affect] : the affect was normal

## 2023-03-15 NOTE — ASSESSMENT
[FreeTextEntry1] : 72M htn, insomnia, hx NP cancer 2010 s/p RT/chemo in remission.  Known pulmonary nodules and bronchiectasis.  He is minimally symptomatic, however, has had 2 episodes of hemoptysis.  CT 2017 with scattered GGO and nodular opacities most prominent in the RUL, RML with scattered diffuse tree-in-bud opacities. CT 2022 with  mixed changes in numerous b/l pulmonary nodular and branching opacities, probably represents waxing and waning infectious/inflammatory processes.  Increased cavitated nodule with peripheral solid component at RLL - probably infectious/inflammatory or neoplastic.  He refused bronchoscopy in the past.  We discussed possibility that this could be malignancy which he does not believe.  We discussed the likelihood he will need MAC treatment for a prolonged period of time, usually at least with 1 year of negative cultures.  He is open to starting a work up, however, he at this time is refusing treatment.  Risks and benefits discussed.  He still need more sputum (only one positive sputum) and sensitivities.  Will obtain baseline blood work, induced sputum.  he will bring in disk with [place] [facility] [place] to review.  F/u with me in 6 weeks.  Lea negative.\par

## 2023-04-05 LAB — ACID FAST STN SPT: ABNORMAL

## 2023-05-08 ENCOUNTER — APPOINTMENT (OUTPATIENT)
Dept: OTOLARYNGOLOGY | Facility: CLINIC | Age: 73
End: 2023-05-08

## 2023-05-10 LAB — ACID FAST STN SPT: ABNORMAL

## 2023-05-23 ENCOUNTER — APPOINTMENT (OUTPATIENT)
Dept: INFECTIOUS DISEASE | Facility: CLINIC | Age: 73
End: 2023-05-23
Payer: MEDICARE

## 2023-05-23 VITALS
OXYGEN SATURATION: 99 % | HEIGHT: 70 IN | SYSTOLIC BLOOD PRESSURE: 137 MMHG | HEART RATE: 74 BPM | WEIGHT: 158 LBS | DIASTOLIC BLOOD PRESSURE: 79 MMHG | BODY MASS INDEX: 22.62 KG/M2 | TEMPERATURE: 97.8 F

## 2023-05-23 PROCEDURE — 99214 OFFICE O/P EST MOD 30 MIN: CPT

## 2023-05-23 RX ORDER — PANTOPRAZOLE 40 MG/1
40 TABLET, DELAYED RELEASE ORAL DAILY
Qty: 90 | Refills: 3 | Status: DISCONTINUED | COMMUNITY
Start: 2021-11-12 | End: 2023-05-23

## 2023-05-23 RX ORDER — CIPROFLOXACIN HYDROCHLORIDE 750 MG/1
750 TABLET, FILM COATED ORAL
Qty: 20 | Refills: 0 | Status: ACTIVE | COMMUNITY
Start: 2023-02-16 | End: 1900-01-01

## 2023-05-23 RX ORDER — DUTASTERIDE 0.5 MG/1
0.5 CAPSULE, LIQUID FILLED ORAL
Qty: 90 | Refills: 3 | Status: DISCONTINUED | COMMUNITY
Start: 2021-11-04 | End: 2023-05-23

## 2023-05-23 RX ORDER — AMLODIPINE BESYLATE 5 MG/1
5 TABLET ORAL
Refills: 0 | Status: ACTIVE | COMMUNITY

## 2023-05-23 NOTE — HISTORY OF PRESENT ILLNESS
[FreeTextEntry1] : 72M hx NP cancer 2010 treated with chemotherapy and RT.  Follow up imaging showed "spots" in the lung 2011.  Referred to [facility] and saw Dr. Young.  Saw him annually and had annual CT chest scans.  Was told he had old pneumonia.  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 Tried dual therapy for 2 months and did not tolerate due to severe diarrhea.    \par \par In 2017 or 2018, he had an episode of hemoptysis.  Refused a bronchoscopy (Dr. Young) at [facility].  CT showed bronchiectasis and nodules that were stable.  \par \par He had another episode of hemoptysis in December 2022.  CT done in 2022 showed mixed changes in numerous b/l pulmonary nodular and branching opacities, probably represents waxing and waning infectious/inflammatory processes.  Increased cavitated nodule with peripheral solid component at right lower lobe, probably infectious/inflammatory or neoplastic.  Unchanged borderline subcarinal LN.  Also, he saw Head/Neck surgeon at [facility] ( ) and every thing was okay.  Single sputum was sent to question.  AFB stain was negative.  Not clear if it grew or not, however, molecular testing was +MAC.\par \par He was referred to ID. \par \par Here with occasional productive cough.  Light brown color.   February sputum with Pseudomonas and MAC.  Was prescribed ciprofloxacin but did not take it.  No fever.  No SOB.  Still with sputum.  No chest pain or SOB.  Rest of ROS otherwise negative.\par \par PMHx/PSHx:\par 1972 bad pneumonia\par BPH s/p TURP 2022\par HTN\par Insomnia\par NP cancer 2010\par 2001 laminectomy\par

## 2023-05-23 NOTE — ASSESSMENT
[FreeTextEntry1] : 72M htn, insomnia, hx NP cancer 2010 s/p RT/chemo in remission.  Known pulmonary nodules and bronchiectasis.  He is minimally symptomatic, however, has had 2 episodes of hemoptysis.  CT 2017 with scattered GGO and nodular opacities most prominent in the RUL, RML with scattered diffuse tree-in-bud opacities. CT 2022 with  mixed changes in numerous b/l pulmonary nodular and branching opacities, probably represents waxing and waning infectious/inflammatory processes.  Increased cavitated nodule with peripheral solid component at RLL - probably infectious/inflammatory or neoplastic.  These CTs were uploaded into PACS and reviewed with Chest Radiology today.  He understands the course of treatment for cavitary MAC is over a year.  It should included IV amikacin.  He is adamant about not taking antibiotics because he is afraid of the side effects.  We discussed ways we can mitigate the side effects and he remains adamant about no treatment.  At least he should practice good airway clearance with hypertonic saline nebulizer and he is not interested.  He will f/u again in 3 months.  We should obtain CT at that time to assess for progression.  He will at least try the ciprofloxacin.  Adverse effects disuscussed.\par

## 2023-05-23 NOTE — DATA REVIEWED
[FreeTextEntry1] : 8/2017 CHEST CT:\par LUNGS AND LARGE AIRWAYS: Small amount of debris in the upper trachea. Mild bronchiectasis most predominant in the right middle lobe.  Mucoid impaction is noted. Scattered areas of groundglass and nodular opacities in the right upper upper and middle lobe and diffuse scattered areas of tree-in-bud opacities. Two 4 mm nodules in the right lower lobe.\par IMPRESSION: Scattered areas of groundglass and nodular opacities most prominent in the right upper and middle lobes with scattered diffuse tree-in-bud opacities. This is indeterminate and may be infectious/inflammatory. Superimposed right upper lobe part products are consideration. Given right middle lobe bronchiectasis, LUIS should be considered. 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  Since exam on April 4, 2013, there has been worsening of lung disease and multiple new hypermetabolic lung nodules as described above. Dedicated CT chest and/or biopsy may be of clinical value. The exact nature of the \par lung disease is unclear from the available information.  New focus of hypermetabolism in the rectum with wall thickening, may represent normal bowel activity, however endoscopic visualization is advised if has not been recently performed.\par \par December 8, 2022 \par CT CHEST from [facility]\par Mixed changes in numerous bilateral lung nodules.\par Impression: Since November 16, 2021, mixed changes in numerous b/l pulmonary nodular and branching opacities, probably represents waxing and waning infectious/inflammatory processes.  Increased cavitated nodule with peripheral solid component at right lower lobe, probably infectious/inflammatory or neoplastic.  Attention on f/u is suggested. Unchanged borderline subcarinal LN\par \par \par [facility]:\par 12/9/2022\par Sputum AFB smear negative\par Result of DNA probe +MAC\par not clear if it grew or not based on paper copy of result\par \par 2/13/2023\par Sputum AFB (+) MAC\par Sputum cx (+) pseudomonas fluorescens/putida\par \par 2/16/2023\par Sputum AFB (+) MAC\par \par   \par \par \par \par \par \par \par

## 2023-05-23 NOTE — PHYSICAL EXAM
[General Appearance - In No Acute Distress] : in no acute distress [Sclera] : the sclera and conjunctiva were normal [Outer Ear] : the ears and nose were normal in appearance [Neck Appearance] : the appearance of the neck was normal [Auscultation Breath Sounds / Voice Sounds] : lungs were clear to auscultation bilaterally [Heart Sounds] : normal S1 and S2 [Edema] : there was no peripheral edema [Bowel Sounds] : normal bowel sounds [No Palpable Adenopathy] : no palpable adenopathy [Musculoskeletal - Swelling] : no joint swelling [] : no rash [No Focal Deficits] : no focal deficits [Affect] : the affect was normal

## 2023-10-16 ENCOUNTER — NON-APPOINTMENT (OUTPATIENT)
Age: 73
End: 2023-10-16

## 2023-10-17 ENCOUNTER — APPOINTMENT (OUTPATIENT)
Dept: OTOLARYNGOLOGY | Facility: CLINIC | Age: 73
End: 2023-10-17
Payer: MEDICARE

## 2023-10-17 VITALS
SYSTOLIC BLOOD PRESSURE: 128 MMHG | WEIGHT: 155 LBS | HEIGHT: 70 IN | TEMPERATURE: 96.1 F | DIASTOLIC BLOOD PRESSURE: 79 MMHG | BODY MASS INDEX: 22.19 KG/M2 | HEART RATE: 81 BPM

## 2023-10-17 PROCEDURE — 99214 OFFICE O/P EST MOD 30 MIN: CPT | Mod: 25

## 2023-10-17 PROCEDURE — 31231 NASAL ENDOSCOPY DX: CPT

## 2023-11-02 NOTE — ED ADULT NURSE NOTE - PRIMARY CARE PROVIDER
Clinic Administered Medication Documentation      Depo Provera Documentation    Depo-Provera Standing Order inclusion/exclusion criteria reviewed.     Is this the initial or subsequent dose of Depo Provera? Subsequent dose - patient is within the acceptable window of time (11-15 weeks) for subsequent injection. Pregnancy test not indicated.    Patient meets: inclusion criteria     Is there an active order (written within the past 365 days, with administrations remaining, not ) in the chart? Yes.     Prior to injection, verified patient identity using patient's name and date of birth. Medication was administered. Please see MAR and medication order for additional information.     Vial/Syringe: Single dose vial. Was entire vial of medication used? Yes    Patient instructed to remain in clinic for 15 minutes and report any adverse reaction to staff immediately.  NEXT INJECTION DUE: 24 - 2/15/24      
[place]

## 2023-11-14 ENCOUNTER — TRANSCRIPTION ENCOUNTER (OUTPATIENT)
Age: 73
End: 2023-11-14

## 2023-11-15 ENCOUNTER — TRANSCRIPTION ENCOUNTER (OUTPATIENT)
Age: 73
End: 2023-11-15

## 2023-11-17 ENCOUNTER — APPOINTMENT (OUTPATIENT)
Dept: INFECTIOUS DISEASE | Facility: CLINIC | Age: 73
End: 2023-11-17
Payer: MEDICARE

## 2023-11-17 VITALS
HEART RATE: 81 BPM | SYSTOLIC BLOOD PRESSURE: 150 MMHG | OXYGEN SATURATION: 98 % | DIASTOLIC BLOOD PRESSURE: 79 MMHG | HEIGHT: 70 IN

## 2023-11-17 DIAGNOSIS — J47.9 BRONCHIECTASIS, UNCOMPLICATED: ICD-10-CM

## 2023-11-17 PROCEDURE — 99214 OFFICE O/P EST MOD 30 MIN: CPT

## 2023-11-30 ENCOUNTER — TRANSCRIPTION ENCOUNTER (OUTPATIENT)
Age: 73
End: 2023-11-30

## 2023-12-01 ENCOUNTER — OUTPATIENT (OUTPATIENT)
Dept: OUTPATIENT SERVICES | Facility: HOSPITAL | Age: 73
LOS: 1 days | End: 2023-12-01
Payer: MEDICARE

## 2023-12-01 ENCOUNTER — APPOINTMENT (OUTPATIENT)
Dept: CT IMAGING | Facility: CLINIC | Age: 73
End: 2023-12-01
Payer: MEDICARE

## 2023-12-01 DIAGNOSIS — Z98.890 OTHER SPECIFIED POSTPROCEDURAL STATES: Chronic | ICD-10-CM

## 2023-12-01 DIAGNOSIS — J47.9 BRONCHIECTASIS, UNCOMPLICATED: ICD-10-CM

## 2023-12-01 PROCEDURE — 71250 CT THORAX DX C-: CPT

## 2023-12-01 PROCEDURE — 71250 CT THORAX DX C-: CPT | Mod: 26,MH

## 2023-12-08 DIAGNOSIS — R04.2 HEMOPTYSIS: ICD-10-CM

## 2023-12-08 DIAGNOSIS — R04.0 EPISTAXIS: ICD-10-CM

## 2023-12-09 ENCOUNTER — TRANSCRIPTION ENCOUNTER (OUTPATIENT)
Age: 73
End: 2023-12-09

## 2023-12-20 ENCOUNTER — OUTPATIENT (OUTPATIENT)
Dept: OUTPATIENT SERVICES | Facility: HOSPITAL | Age: 73
LOS: 1 days | End: 2023-12-20
Payer: MEDICARE

## 2023-12-20 ENCOUNTER — APPOINTMENT (OUTPATIENT)
Dept: CT IMAGING | Facility: HOSPITAL | Age: 73
End: 2023-12-20

## 2023-12-20 DIAGNOSIS — Z98.890 OTHER SPECIFIED POSTPROCEDURAL STATES: Chronic | ICD-10-CM

## 2023-12-20 LAB
POCT ISTAT CREATININE: 0.9 MG/DL — SIGNIFICANT CHANGE UP (ref 0.5–1.3)
POCT ISTAT CREATININE: 0.9 MG/DL — SIGNIFICANT CHANGE UP (ref 0.5–1.3)

## 2023-12-20 PROCEDURE — 70491 CT SOFT TISSUE NECK W/DYE: CPT

## 2023-12-20 PROCEDURE — 70491 CT SOFT TISSUE NECK W/DYE: CPT | Mod: 26

## 2023-12-20 PROCEDURE — 82565 ASSAY OF CREATININE: CPT

## 2024-01-09 ENCOUNTER — APPOINTMENT (OUTPATIENT)
Dept: OTOLARYNGOLOGY | Facility: CLINIC | Age: 74
End: 2024-01-09

## 2024-01-10 ENCOUNTER — TRANSCRIPTION ENCOUNTER (OUTPATIENT)
Age: 74
End: 2024-01-10

## 2024-01-10 LAB — ACID FAST STN SPT: ABNORMAL

## 2024-01-11 ENCOUNTER — TRANSCRIPTION ENCOUNTER (OUTPATIENT)
Age: 74
End: 2024-01-11

## 2024-03-12 ENCOUNTER — APPOINTMENT (OUTPATIENT)
Dept: INFECTIOUS DISEASE | Facility: CLINIC | Age: 74
End: 2024-03-12

## 2024-04-26 NOTE — PRE-ANESTHESIA EVALUATION ADULT - NSANTHDISPORD_GEN_ALL_CORE
-- DO NOT REPLY / DO NOT REPLY ALL --  -- Message is from Engagement Center Operations (ECO) --    General Patient Message: [facility] has a question in regard to clonazePAM (KlonoPIN) 1 MG tablet, patent is out of medication, Pharmacy would like a call back today, please call back to advise      Caller Information         Type Contact Phone/Fax    04/26/2024 11:38 AM CDT Phone (Incoming) [facility] - [place], [place] - [address] (Pharmacy) [phone]          Alternative phone number: no    Can a detailed message be left? Yes    Message Turnaround:               
Verified dose of clonazepam patient take 1 mg oral nightly   Verbal order given script updated on med list   
PACU

## 2024-07-01 ENCOUNTER — TRANSCRIPTION ENCOUNTER (OUTPATIENT)
Age: 74
End: 2024-07-01

## 2024-07-23 ENCOUNTER — APPOINTMENT (OUTPATIENT)
Dept: INTERNAL MEDICINE | Facility: CLINIC | Age: 74
End: 2024-07-23
Payer: MEDICARE

## 2024-07-23 VITALS
HEIGHT: 70 IN | HEART RATE: 97 BPM | OXYGEN SATURATION: 98 % | DIASTOLIC BLOOD PRESSURE: 84 MMHG | WEIGHT: 158 LBS | TEMPERATURE: 98.4 F | BODY MASS INDEX: 22.62 KG/M2 | SYSTOLIC BLOOD PRESSURE: 160 MMHG

## 2024-07-23 PROBLEM — Z86.19 HISTORY OF MAC INFECTION: Status: ACTIVE | Noted: 2024-07-23

## 2024-07-23 PROCEDURE — 99205 OFFICE O/P NEW HI 60 MIN: CPT

## 2024-07-23 RX ORDER — EZETIMIBE 10 MG/1
10 TABLET ORAL
Refills: 0 | Status: ACTIVE | COMMUNITY

## 2024-07-23 NOTE — HISTORY OF PRESENT ILLNESS
[Former] : former [>= 20 pack years] : >= 20 pack years [Never] : never [Wheezing] : wheezing [Nasal Passage Blockage (Stuffiness)] : edema [Nonspecific Pain, Swelling, And Stiffness] : chest pain [Fever] : fever [Difficulty Breathing During Exertion] : dyspnea on exertion [Feelings Of Weakness On Exertion] : exercise intolerance [Cough] : coughing [Wt Loss ___ kg] : Recent [unfilled] kg(s) weight loss [Does not check] : The patient is not checking peak flow at home [2  -  Slight] : 2, slight [Class II - Mild Symptoms and Slight Limitations] : II [Patient/Caregiver unclear of wishes] : Patient/Caregiver unclear of wishes [Wt Gain ___ kg] : No recent weight gain [More Frequent Use Needed Recently] : Patient reports no recent increase in frequency of [TextBox_4] : The patient reports that he is under the care of Dr. Vizcaino of infectious diseases.  He has a history of bronchiectasis and reportedly has LUIS for which he has refused therapy.  He reports that Dr. Vizcaino referred him to Dr. Conner or Dr. Sierra of pulmonary medicine.  He plans to see Dr. Sierra.  He states that he was unable to obtain an appointment with her in a timely manner.  In the meanwhile, he had an episode of significant hemoptysis yesterday.  He states that he was able to obtain an appointment with me today due to a cancellation and decided to come in for a pulmonary evaluation here today. He reports that he was diagnosed with bronchiectasis approximately 2 years ago.  He has a history of nasopharyngeal cancer.  He reports that he has been under the care of pulmonary at [facility] in [facility].  He has been under the care of Dr. Vizcaino of infectious diseases who wants him to obtain a pulmonary physician in our system.  He reports that in 1972 while a college student he had a case of severe [place] flu.  He then developed a bad bout of pneumonia.  He reports that in 2003 he was evaluated by pulmonary for persistent night sweats.  He states that he was told that he had recurrence of his pneumonia and was treated with a 2-month course of antibiotic therapy.  He denies any history of bronchitis, pleurisy, tuberculosis, asthma, COPD, pneumothorax, pulmonary embolism, lung cancer, sleep apnea.  He denies any prior history of lung surgery.  He reports that his father suffered with emphysema.  He has no known drug allergies.  His medication list was reviewed.  He reports that he smoked 1 pack/day of cigarettes for at least 30 years but discontinued smoking in 2003.  He drinks alcohol socially.  He denies any drug use.  He reports that he is  and retired.  He worked in the [employer].  He denies any occupational exposures.  He has no pets in his home.  He reports that he was born in [facility].  He has lived in [place] since 1997.  He did take a trip to [place] in 2023 for 2 months.  He reports that he did take COVID vaccines.  He does not take influenza, pneumococcal or RSV vaccinations.  His last chest CT was in December 2023.  He has not had recent PFTs. He presently denies any chest pain.  He states that he does not have any significant cough.  Over the last 2 to 3 years he has had a 15 pound weight loss.  He denies any edema or calf pain.  He denies any fevers/chills/sweats.  He denies any shortness of breath or wheezing.  He reports that he has had 2 episodes of significant hemoptysis.  He had a bout last year while in [place].  He reports that he had another bout of significant hemoptysis yesterday.  He is unable to quantify the amount but believes that it may have been at least a half of cup of hemoptysis. He reports that he has not had any further hemoptysis since yesterday. . [TextBox_11] : 1 [TextBox_13] : 30 [YearQuit] : 2003 [de-identified] : Hemoptysis [ESS] : 0 [TextBox_42] : 12/23

## 2024-07-23 NOTE — REASON FOR VISIT
[Initial] : an initial visit [Abnormal CXR/ Chest CT] : an abnormal CXR/ chest CT [Cough] : cough [Bronchiectasis] : bronchiectasis [Pulmonary Nodules] : pulmonary nodules

## 2024-07-23 NOTE — PHYSICAL EXAM
[No Acute Distress] : no acute distress [Well Nourished] : well nourished [Well Groomed] : well groomed [Well Developed] : well developed [Normal Oropharynx] : normal oropharynx [Supple] : supple [No JVD] : no jvd [Normal Rate/Rhythm] : normal rate/rhythm [Normal S1, S2] : normal s1, s2 [No Resp Distress] : no resp distress [No Acc Muscle Use] : no acc muscle use [Normal Rhythm and Effort] : normal rhythm and effort [Clear to Auscultation Bilaterally] : clear to auscultation bilaterally [No Abnormalities] : no abnormalities [Benign] : benign [Normal Gait] : normal gait [No Clubbing] : no clubbing [No Cyanosis] : no cyanosis [No Edema] : no edema [Normal Color/ Pigmentation] : normal color/ pigmentation [No Focal Deficits] : no focal deficits [Oriented x3] : oriented x3 [Normal Affect] : normal affect [TextBox_11] : Wearing glasses; no sinus tenderness; pupils reactive to light; extraocular muscles intact [TextBox_44] : No stridor [TextBox_54] : No cords [TextBox_68] : R = 16; good airflow; no wheezing noted

## 2024-07-23 NOTE — REVIEW OF SYSTEMS
[Recent Wt Loss (___ Lbs)] : ~T recent [unfilled] lb weight loss [Cough] : cough [Hemoptysis] : hemoptysis [SOB on Exertion] : sob on exertion [Negative] : Endocrine

## 2024-07-23 NOTE — ASSESSMENT
[FreeTextEntry1] : Bronchiectasis History of MAC infection Has declined treatment = last chest CT with progressive disease Has now had 2 bouts of significant hemoptysis = last episode yesterday Hemoptysis has ceased  He states that his ID attending has asked him to see Dr. Sierra = pulmonary medicine He states that he was unable to obtain a timely appointment with her He states that he wanted pulmonary evaluation due to a bout of significant hemoptysis yesterday and was able to make an appointment to see me today after a cancellation He plans to see Dr. Sierra as advised by Dr. Vizcaino going forward = he plans to call and set up an appointment with her I advised him that I will reach out to her regarding an appointment for him Since his hemoptysis has ceased he adamantly refuses ER evaluation He also refuses bronchoscopy He requests and was given a prescription to do a follow-up chest CT He was given sputum cups to quantitate any recurrent hemoptysis He knows to go to the emergency room should he develop recurrent significant hemoptysis for further evaluation and management He will collect any sputum for full cultures = cups and prescription given PFTs were declined He refuses vaccines He no longer smokes ID follow-up He continues to refuse therapy for MAC infection.  The consequences of doing so were reviewed with him in detail = he still refuses therapy Once stabilized: Can consider pulmonary rehabilitation Can consider clearance device or clearance vest for secretion mobilization Needs evaluation for etiology of bronchiectasis He refuses any standing bronchodilator therapy He presently refuses any cough suppressant medication in the setting of acute hemoptysis He presently refuses a short course of antibiotic therapy in the setting of acute hemoptysis He plans to discuss discontinuation of aspirin therapy with his primary care physician and cardiologist At this time he was advised to rest and refrain from heavy activity He plans pulmonary follow-up with Dr. Sierra In the meanwhile he knows that he can call if his status worsens or changes or for any pulmonary issues until he has established care with her All of the above was discussed in detail with him All questions were answered He verbally confirmed understanding of all of the above and agreement with the above plan Previous records reviewed in detail

## 2024-07-25 ENCOUNTER — APPOINTMENT (OUTPATIENT)
Dept: CT IMAGING | Facility: CLINIC | Age: 74
End: 2024-07-25
Payer: MEDICARE

## 2024-07-25 PROCEDURE — 71250 CT THORAX DX C-: CPT | Mod: 26,MH

## 2024-07-29 ENCOUNTER — TRANSCRIPTION ENCOUNTER (OUTPATIENT)
Age: 74
End: 2024-07-29

## 2024-07-29 LAB — BACTERIA SPT CULT: ABNORMAL

## 2024-07-31 ENCOUNTER — APPOINTMENT (OUTPATIENT)
Dept: PULMONOLOGY | Facility: CLINIC | Age: 74
End: 2024-07-31
Payer: MEDICARE

## 2024-07-31 VITALS
HEIGHT: 69 IN | OXYGEN SATURATION: 96 % | WEIGHT: 156 LBS | RESPIRATION RATE: 16 BRPM | HEART RATE: 105 BPM | SYSTOLIC BLOOD PRESSURE: 176 MMHG | DIASTOLIC BLOOD PRESSURE: 83 MMHG | BODY MASS INDEX: 23.11 KG/M2

## 2024-07-31 DIAGNOSIS — J47.9 BRONCHIECTASIS, UNCOMPLICATED: ICD-10-CM

## 2024-07-31 DIAGNOSIS — Z86.19 PERSONAL HISTORY OF OTHER INFECTIOUS AND PARASITIC DISEASES: ICD-10-CM

## 2024-07-31 DIAGNOSIS — A49.02 METHICILLIN RESISTANT STAPHYLOCOCCUS AUREUS INFECTION, UNSPECIFIED SITE: ICD-10-CM

## 2024-07-31 DIAGNOSIS — R04.2 HEMOPTYSIS: ICD-10-CM

## 2024-07-31 PROCEDURE — 99214 OFFICE O/P EST MOD 30 MIN: CPT

## 2024-07-31 RX ORDER — DOXYCYCLINE HYCLATE 100 MG/1
100 TABLET ORAL
Qty: 29 | Refills: 0 | Status: ACTIVE | COMMUNITY
Start: 2024-07-31 | End: 1900-01-01

## 2024-07-31 NOTE — REVIEW OF SYSTEMS
[Recent Wt Loss (___ Lbs)] : ~T recent [unfilled] lb weight loss [Cough] : cough [Hemoptysis] : hemoptysis [Chest Tightness] : no chest tightness [Frequent URIs] : no frequent URIs [Sputum] : no sputum [Dyspnea] : no dyspnea [Wheezing] : no wheezing [Negative] : Musculoskeletal

## 2024-07-31 NOTE — PHYSICAL EXAM
[No Acute Distress] : no acute distress [Normal Oropharynx] : normal oropharynx [Normal Appearance] : normal appearance [No Neck Mass] : no neck mass [Normal Rate/Rhythm] : normal rate/rhythm [Normal S1, S2] : normal s1, s2 [No Murmurs] : no murmurs [No Resp Distress] : no resp distress [Clear to Auscultation Bilaterally] : clear to auscultation bilaterally [No Abnormalities] : no abnormalities [Benign] : benign [Normal Gait] : normal gait [No Clubbing] : no clubbing [No Cyanosis] : no cyanosis [No Edema] : no edema [Normal Color/ Pigmentation] : normal color/ pigmentation [Oriented x3] : oriented x3 [Normal Affect] : normal affect

## 2024-07-31 NOTE — HISTORY OF PRESENT ILLNESS
[Former] : former [TextBox_4] : 73 year old male wiht history of bronchiectasis and LUIS, complicated by hemoptysis.  He sees Dr. Vizcaino and has refused LUIS treatment. 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   [place] [facility] [place] and was given two antibiotics for 2 months.  Told that he had evidence of old pneumonia that reactivated. he had a lot of GI issues with the antibiotics and stopped taking them. History of bronchiectasis diagnosed in the last 2 years.  Reports an episode of severe influenza when he was a college student, and this was followed by severe pneumonia.  He denies history of asthma, bronchitis, TB, or frequent recurrent infections.   Had episode of hemoptysis on 7/22, 1/2 cup blood.  First time he had hemoptysis was one year ago- 8/2023, coughed up blood again while he was in [facility]. No nose bleeds.  On both episodes bleeding stopped spontaneously.   Denies frequent respiratory infections. Denies shortness of breath.  Feels short of breath walking up stairs.  no wheezing. occasional cough. Does not produce sputum.  Has lost 15 lbs in 2 years. Denies fevers or night sweats now.    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   He saw Dr. Concepcion a few days ago who sent him for a Chest Ct, non contrast and sent sputum for AFB, C& S and fungal cultures. [TextBox_11] : 1 [TextBox_13] : 30 [YearQuit] : 2003

## 2024-07-31 NOTE — ASSESSMENT
[FreeTextEntry1] : 73 year old male with history of nasopharyngeal cancer treated in 2010 now in remission, also with HTN, HLD, former smoker with bronchiectasis and LUIS on sputum in February and NOvember of 2023.  He has seen Dr. Vizcaino who recommended treatment of LUIS and he has refused.  IM[employer]e workup has been negative.  Also with two episodes of hemoptysis in the past, one a year ago, and second episode a few days ago.    Recent cultures from 7/26/24 show MRSA sensitive to tetracycline. AFB and fungal are negative thus far Sputum for AFB positive on 11/20/23, 2/16/23 and 2/13/23.  Chest CT 7/25/24 personally reviewed by me: Report as follows: "In comparison with 12/1/2023, new patchy ground-glass opacity within the right upper lobe, new 1.7 cm subpleural solid nodule within the basilar right lower lobe. A 1.1 cm left upper lobe nodule developed cavitation.  Otherwise, stable bilateral bronchiectasis and nodular opacities."  Impression:  LUIS in a patient with bronchiectasis with some progression as noted above.  New GGO could easily represent aspirated blood.  Given positive MRSA culture and recent episode of hemoptysis will treat with antibiotics. He would benefit from treatment as he is having hemoptysis and some progression on his CT- cavitation and new nodules.  Also with history of weight loss over the last two years.  Patient adamantly refuses treatment for LIUS.  Plan: doxycycline 100 BID for 10 days complete PFT after course of antibiotics is completed Will review cT with radiology.  May need CTPA to evaluate for bronchial artery hypertrophy given bleeding. f/U in 4-6 weeks.

## 2024-08-03 LAB
ACID FAST STN SPT: NORMAL
FUNGUS SPT CULT: NORMAL

## 2024-08-05 ENCOUNTER — TRANSCRIPTION ENCOUNTER (OUTPATIENT)
Age: 74
End: 2024-08-05

## 2024-08-06 ENCOUNTER — TRANSCRIPTION ENCOUNTER (OUTPATIENT)
Age: 74
End: 2024-08-06

## 2024-08-14 ENCOUNTER — APPOINTMENT (OUTPATIENT)
Dept: PULMONOLOGY | Facility: CLINIC | Age: 74
End: 2024-08-14
Payer: SELF-PAY

## 2024-08-14 ENCOUNTER — APPOINTMENT (OUTPATIENT)
Dept: PULMONOLOGY | Facility: CLINIC | Age: 74
End: 2024-08-14
Payer: MEDICARE

## 2024-08-14 VITALS
DIASTOLIC BLOOD PRESSURE: 76 MMHG | OXYGEN SATURATION: 98 % | WEIGHT: 157 LBS | BODY MASS INDEX: 23.25 KG/M2 | SYSTOLIC BLOOD PRESSURE: 145 MMHG | HEART RATE: 95 BPM | HEIGHT: 69 IN

## 2024-08-14 DIAGNOSIS — R05.9 COUGH, UNSPECIFIED: ICD-10-CM

## 2024-08-14 DIAGNOSIS — R91.1 SOLITARY PULMONARY NODULE: ICD-10-CM

## 2024-08-14 DIAGNOSIS — A49.02 METHICILLIN RESISTANT STAPHYLOCOCCUS AUREUS INFECTION, UNSPECIFIED SITE: ICD-10-CM

## 2024-08-14 DIAGNOSIS — J47.9 BRONCHIECTASIS, UNCOMPLICATED: ICD-10-CM

## 2024-08-14 PROCEDURE — 94799 UNLISTED PULMONARY SVC/PX: CPT

## 2024-08-14 PROCEDURE — 99214 OFFICE O/P EST MOD 30 MIN: CPT

## 2024-08-14 RX ORDER — ALBUTEROL SULFATE 2.5 MG/3ML
(2.5 MG/3ML) SOLUTION RESPIRATORY (INHALATION)
Qty: 1 | Refills: 5 | Status: ACTIVE | COMMUNITY
Start: 2024-08-14 | End: 1900-01-01

## 2024-08-14 NOTE — HISTORY OF PRESENT ILLNESS
[Former] : former [TextBox_4] : 73 year old male wiht history of bronchiectasis and LUIS, complicated by hemoptysis.  He sees Dr. Vizcaino and has refused LUIS treatment. 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   Saw pulmonary specialist at [facility] in 2003 and was given two antibiotics for 2 months.  Told that he had evidence of old pneumonia that reactivated. he had a lot of GI issues with the antibiotics and stopped taking them. History of bronchiectasis diagnosed in the last 2 years.  Reports an episode of severe influenza when he was a college student, and this was followed by severe pneumonia.  He denies history of asthma, bronchitis, TB, or frequent recurrent infections.   Had episode of hemoptysis on 7/22, 1/2 cup blood.  First time he had hemoptysis was one year ago- 8/2023, coughed up blood again while he was in [facility]. No nose bleeds.  On both episodes bleeding stopped spontaneously.   Denies frequent respiratory infections. Denies shortness of breath.  Feels short of breath walking up stairs.  no wheezing. occasional cough. Does not produce sputum.  Has lost 15 lbs in 2 years. Denies fevers or night sweats now.    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   He saw Dr. Concepcion a few days ago who sent him for a Chest Ct, non contrast and sent sputum for AFB, C& S and fungal cultures.  Returns for follow up today.  NO further hemoptysis, reports occasional sputum.  Sputum cultures positive for MRSA- he was treated with doxycycline for 10 days.  also positive for MAC again. Denies dyspnea or infectious symptoms.  [TextBox_11] : 1 [TextBox_13] : 30 [YearQuit] : 2003

## 2024-08-14 NOTE — ASSESSMENT
[FreeTextEntry1] : 73 year old male with history of nasopharyngeal cancer treated in 2010 now in remission, also with HTN, HLD, former smoker with bronchiectasis and LUIS on sputum in February and NOvember of 2023.  He has seen Dr. Vizcaino who recommended treatment of LUIS and he has refused.  IMmune workup has been negative.  Also with two episodes of hemoptysis in the past, one a year ago, and second episode a few weeks ago.  Recent cultures from 7/26/24 show MRSA sensitive to tetracycline. AFB and fungal are negative thus far Sputum for AFB positive on 11/20/23, 2/16/23 and 2/13/23.  Chest CT 7/25/24 personally reviewed by me: Report as follows: "In comparison with 12/1/2023, new patchy ground-glass opacity within the right upper lobe, new 1.7 cm subpleural solid nodule within the basilar right lower lobe. A 1.1 cm left upper lobe nodule developed cavitation.  Otherwise, stable bilateral bronchiectasis and nodular opacities."  Impression:  LUIS in a patient with bronchiectasis with some progression as noted above.  Post treatment of MRSA infection in sputum.  He denies daily sputum production, is concerned about Staph in his sputum..  Plan: complete PFT Start airway clearance with albuterol followed by aerobika valve twice daily Will need follow up chest CT, which I can obtain when he returns from [facility] in october.   Additional sputum sent today. he is leaving for [facility] in one week.

## 2024-08-14 NOTE — ASSESSMENT
[FreeTextEntry1] : 73 year old male with history of nasopharyngeal cancer treated in 2010 now in remission, also with HTN, HLD, former smoker with bronchiectasis and LUIS on sputum in February and NOvember of 2023.  He has seen Dr. Vizcaino who recommended treatment of LUIS and he has refused.  IM[employer]e workup has been negative.  Also with two episodes of hemoptysis in the past, one a year ago, and second episode a few weeks ago.  Recent cultures from 7/26/24 show MRSA sensitive to tetracycline. AFB and fungal are negative thus far Sputum for AFB positive on 11/20/23, 2/16/23 and 2/13/23.  Chest CT 7/25/24 personally reviewed by me: Report as follows: "In comparison with 12/1/2023, new patchy ground-glass opacity within the right upper lobe, new 1.7 cm subpleural solid nodule within the basilar right lower lobe. A 1.1 cm left upper lobe nodule developed cavitation.  Otherwise, stable bilateral bronchiectasis and nodular opacities."  Impression:  LUIS in a patient with bronchiectasis with some progression as noted above.  Post treatment of MRSA infection in sputum.  He denies daily sputum production, is concerned about Staph in his sputum..  Plan: complete PFT Start airway clearance with albuterol followed by aerobika valve twice daily Will need follow up chest CT, which I can obtain when he returns from [facility] in october.   Additional sputum sent today. he is leaving for [facility] in one week.

## 2024-08-14 NOTE — HISTORY OF PRESENT ILLNESS
[Former] : former [TextBox_4] : 73 year old male wiht history of bronchiectasis and LUIS, complicated by hemoptysis.  He sees Dr. Vizcaino and has refused LUIS treatment. 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   [place] [facility] [place] and was given two antibiotics for 2 months.  Told that he had evidence of old pneumonia that reactivated. he had a lot of GI issues with the antibiotics and stopped taking them. History of bronchiectasis diagnosed in the last 2 years.  Reports an episode of severe influenza when he was a college student, and this was followed by severe pneumonia.  He denies history of asthma, bronchitis, TB, or frequent recurrent infections.   Had episode of hemoptysis on 7/22, 1/2 cup blood.  First time he had hemoptysis was one year ago- 8/2023, coughed up blood again while he was in [facility]. No nose bleeds.  On both episodes bleeding stopped spontaneously.   Denies frequent respiratory infections. Denies shortness of breath.  Feels short of breath walking up stairs.  no wheezing. occasional cough. Does not produce sputum.  Has lost 15 lbs in 2 years. Denies fevers or night sweats now.    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   He saw Dr. Concepcion a few days ago who sent him for a Chest Ct, non contrast and sent sputum for AFB, C& S and fungal cultures.  Returns for follow up today.  NO further hemoptysis, reports occasional sputum.  Sputum cultures positive for MRSA- he was treated with doxycycline for 10 days.  also positive for MAC again. Denies dyspnea or infectious symptoms.  [TextBox_11] : 1 [TextBox_13] : 30 [YearQuit] : 2003

## 2024-08-16 ENCOUNTER — LABORATORY RESULT (OUTPATIENT)
Age: 74
End: 2024-08-16

## 2024-08-20 ENCOUNTER — NON-APPOINTMENT (OUTPATIENT)
Age: 74
End: 2024-08-20

## 2024-08-20 ENCOUNTER — TRANSCRIPTION ENCOUNTER (OUTPATIENT)
Age: 74
End: 2024-08-20

## 2024-08-20 LAB
ACID FAST STN SPT: NORMAL
BACTERIA SPT CULT: ABNORMAL

## 2024-08-20 RX ORDER — CIPROFLOXACIN HYDROCHLORIDE 500 MG/1
500 TABLET, FILM COATED ORAL
Qty: 28 | Refills: 0 | Status: ACTIVE | COMMUNITY
Start: 2024-08-20 | End: 1900-01-01

## 2024-08-27 ENCOUNTER — NON-APPOINTMENT (OUTPATIENT)
Age: 74
End: 2024-08-27

## 2024-10-02 NOTE — ED ADULT NURSE NOTE - ISOLATION TYPE:
Please follow up with your cardiologist regarding inpatient cardiology testing   Please follow up with your primary care doctor regarding hospital course and medication changes.  None

## 2024-12-02 ENCOUNTER — APPOINTMENT (OUTPATIENT)
Dept: CT IMAGING | Facility: CLINIC | Age: 74
End: 2024-12-02
Payer: MEDICARE

## 2024-12-02 ENCOUNTER — OUTPATIENT (OUTPATIENT)
Dept: OUTPATIENT SERVICES | Facility: HOSPITAL | Age: 74
LOS: 1 days | End: 2024-12-02
Payer: MEDICARE

## 2024-12-02 ENCOUNTER — NON-APPOINTMENT (OUTPATIENT)
Age: 74
End: 2024-12-02

## 2024-12-02 DIAGNOSIS — J47.9 BRONCHIECTASIS, UNCOMPLICATED: ICD-10-CM

## 2024-12-02 DIAGNOSIS — Z98.890 OTHER SPECIFIED POSTPROCEDURAL STATES: Chronic | ICD-10-CM

## 2024-12-02 DIAGNOSIS — R04.2 HEMOPTYSIS: ICD-10-CM

## 2024-12-02 PROCEDURE — 71275 CT ANGIOGRAPHY CHEST: CPT | Mod: 26,MH

## 2024-12-02 PROCEDURE — 71275 CT ANGIOGRAPHY CHEST: CPT

## 2024-12-03 ENCOUNTER — APPOINTMENT (OUTPATIENT)
Dept: PULMONOLOGY | Facility: CLINIC | Age: 74
End: 2024-12-03
Payer: MEDICARE

## 2024-12-03 VITALS
RESPIRATION RATE: 17 BRPM | BODY MASS INDEX: 23.11 KG/M2 | WEIGHT: 156 LBS | SYSTOLIC BLOOD PRESSURE: 128 MMHG | HEART RATE: 103 BPM | HEIGHT: 69 IN | OXYGEN SATURATION: 90 % | TEMPERATURE: 97.8 F | DIASTOLIC BLOOD PRESSURE: 72 MMHG

## 2024-12-03 DIAGNOSIS — J47.9 BRONCHIECTASIS, UNCOMPLICATED: ICD-10-CM

## 2024-12-03 DIAGNOSIS — R04.2 HEMOPTYSIS: ICD-10-CM

## 2024-12-03 DIAGNOSIS — Z86.19 PERSONAL HISTORY OF OTHER INFECTIOUS AND PARASITIC DISEASES: ICD-10-CM

## 2024-12-03 PROCEDURE — 99215 OFFICE O/P EST HI 40 MIN: CPT | Mod: GC

## 2024-12-03 PROCEDURE — G2211 COMPLEX E/M VISIT ADD ON: CPT

## 2024-12-03 RX ORDER — CIPROFLOXACIN HYDROCHLORIDE 500 MG/1
500 TABLET, FILM COATED ORAL TWICE DAILY
Qty: 20 | Refills: 0 | Status: ACTIVE | COMMUNITY
Start: 2024-12-03 | End: 1900-01-01

## 2024-12-06 ENCOUNTER — TRANSCRIPTION ENCOUNTER (OUTPATIENT)
Age: 74
End: 2024-12-06

## 2024-12-10 ENCOUNTER — NON-APPOINTMENT (OUTPATIENT)
Age: 74
End: 2024-12-10

## 2024-12-10 LAB — BACTERIA SPT CULT: NORMAL

## 2024-12-13 LAB — ACID FAST STN SPT: ABNORMAL

## 2024-12-18 ENCOUNTER — TRANSCRIPTION ENCOUNTER (OUTPATIENT)
Age: 74
End: 2024-12-18

## 2024-12-26 ENCOUNTER — NON-APPOINTMENT (OUTPATIENT)
Age: 74
End: 2024-12-26

## 2024-12-26 ENCOUNTER — APPOINTMENT (OUTPATIENT)
Dept: GASTROENTEROLOGY | Facility: CLINIC | Age: 74
End: 2024-12-26
Payer: MEDICARE

## 2024-12-26 VITALS
RESPIRATION RATE: 16 BRPM | BODY MASS INDEX: 23.11 KG/M2 | WEIGHT: 156 LBS | HEART RATE: 95 BPM | SYSTOLIC BLOOD PRESSURE: 114 MMHG | HEIGHT: 69 IN | OXYGEN SATURATION: 99 % | DIASTOLIC BLOOD PRESSURE: 60 MMHG

## 2024-12-26 DIAGNOSIS — R10.13 EPIGASTRIC PAIN: ICD-10-CM

## 2024-12-26 DIAGNOSIS — I65.29 OCCLUSION AND STENOSIS OF UNSPECIFIED CAROTID ARTERY: ICD-10-CM

## 2024-12-26 DIAGNOSIS — C11.9 MALIGNANT NEOPLASM OF NASOPHARYNX, UNSPECIFIED: ICD-10-CM

## 2024-12-26 DIAGNOSIS — Z86.19 PERSONAL HISTORY OF OTHER INFECTIOUS AND PARASITIC DISEASES: ICD-10-CM

## 2024-12-26 DIAGNOSIS — J47.9 BRONCHIECTASIS, UNCOMPLICATED: ICD-10-CM

## 2024-12-26 DIAGNOSIS — R04.2 HEMOPTYSIS: ICD-10-CM

## 2024-12-26 DIAGNOSIS — A07.8 OTHER SPECIFIED PROTOZOAL INTESTINAL DISEASES: ICD-10-CM

## 2024-12-26 DIAGNOSIS — R04.0 EPISTAXIS: ICD-10-CM

## 2024-12-26 DIAGNOSIS — I10 ESSENTIAL (PRIMARY) HYPERTENSION: ICD-10-CM

## 2024-12-26 PROCEDURE — 99203 OFFICE O/P NEW LOW 30 MIN: CPT

## 2024-12-26 PROCEDURE — 36415 COLL VENOUS BLD VENIPUNCTURE: CPT

## 2024-12-29 LAB
CRYPTOC AG SER QL: NEGATIVE
G LAMBLIA+C PARVUM AG STL QL: NORMAL
GI PCR PANEL: NOT DETECTED

## 2024-12-30 LAB — DEPRECATED O AND P PREP STL: NORMAL

## 2025-01-13 ENCOUNTER — NON-APPOINTMENT (OUTPATIENT)
Age: 75
End: 2025-01-13

## 2025-01-16 ENCOUNTER — APPOINTMENT (OUTPATIENT)
Dept: PULMONOLOGY | Facility: CLINIC | Age: 75
End: 2025-01-16
Payer: MEDICARE

## 2025-01-16 ENCOUNTER — TRANSCRIPTION ENCOUNTER (OUTPATIENT)
Age: 75
End: 2025-01-16

## 2025-01-16 VITALS
TEMPERATURE: 98.2 F | HEIGHT: 69 IN | OXYGEN SATURATION: 98 % | DIASTOLIC BLOOD PRESSURE: 76 MMHG | WEIGHT: 151 LBS | BODY MASS INDEX: 22.36 KG/M2 | RESPIRATION RATE: 16 BRPM | HEART RATE: 92 BPM | SYSTOLIC BLOOD PRESSURE: 153 MMHG

## 2025-01-16 DIAGNOSIS — B97.89 OTHER SPECIFIED RESPIRATORY DISORDERS: ICD-10-CM

## 2025-01-16 DIAGNOSIS — J47.9 BRONCHIECTASIS, UNCOMPLICATED: ICD-10-CM

## 2025-01-16 DIAGNOSIS — J47.1 BRONCHIECTASIS WITH (ACUTE) EXACERBATION: ICD-10-CM

## 2025-01-16 DIAGNOSIS — J98.8 OTHER SPECIFIED RESPIRATORY DISORDERS: ICD-10-CM

## 2025-01-16 DIAGNOSIS — R05.9 COUGH, UNSPECIFIED: ICD-10-CM

## 2025-01-16 PROCEDURE — 99214 OFFICE O/P EST MOD 30 MIN: CPT

## 2025-01-17 ENCOUNTER — NON-APPOINTMENT (OUTPATIENT)
Age: 75
End: 2025-01-17

## 2025-01-17 PROBLEM — J98.8 VIRAL RESPIRATORY ILLNESS: Status: ACTIVE | Noted: 2025-01-17

## 2025-01-17 PROBLEM — J47.1 BRONCHIECTASIS WITH ACUTE EXACERBATION: Status: ACTIVE | Noted: 2025-01-17

## 2025-01-17 LAB
FLUAV SUBTYP SPEC NAA+PROBE: DETECTED
RAPID RVP RESULT: DETECTED
SARS-COV-2 RNA RESP QL NAA+PROBE: NOT DETECTED

## 2025-01-22 ENCOUNTER — TRANSCRIPTION ENCOUNTER (OUTPATIENT)
Age: 75
End: 2025-01-22

## 2025-01-23 ENCOUNTER — APPOINTMENT (OUTPATIENT)
Dept: RADIOLOGY | Facility: CLINIC | Age: 75
End: 2025-01-23
Payer: MEDICARE

## 2025-01-23 ENCOUNTER — OUTPATIENT (OUTPATIENT)
Dept: OUTPATIENT SERVICES | Facility: HOSPITAL | Age: 75
LOS: 1 days | End: 2025-01-23
Payer: MEDICARE

## 2025-01-23 DIAGNOSIS — Z98.890 OTHER SPECIFIED POSTPROCEDURAL STATES: Chronic | ICD-10-CM

## 2025-01-23 DIAGNOSIS — J47.1 BRONCHIECTASIS WITH (ACUTE) EXACERBATION: ICD-10-CM

## 2025-01-23 PROCEDURE — 71046 X-RAY EXAM CHEST 2 VIEWS: CPT

## 2025-01-23 PROCEDURE — 71046 X-RAY EXAM CHEST 2 VIEWS: CPT | Mod: 26

## 2025-01-27 ENCOUNTER — TRANSCRIPTION ENCOUNTER (OUTPATIENT)
Age: 75
End: 2025-01-27

## 2025-02-05 ENCOUNTER — TRANSCRIPTION ENCOUNTER (OUTPATIENT)
Age: 75
End: 2025-02-05

## 2025-02-06 ENCOUNTER — TRANSCRIPTION ENCOUNTER (OUTPATIENT)
Age: 75
End: 2025-02-06

## 2025-02-07 ENCOUNTER — TRANSCRIPTION ENCOUNTER (OUTPATIENT)
Age: 75
End: 2025-02-07

## 2025-02-12 ENCOUNTER — NON-APPOINTMENT (OUTPATIENT)
Age: 75
End: 2025-02-12

## 2025-03-17 ENCOUNTER — TRANSCRIPTION ENCOUNTER (OUTPATIENT)
Age: 75
End: 2025-03-17

## 2025-03-17 ENCOUNTER — NON-APPOINTMENT (OUTPATIENT)
Age: 75
End: 2025-03-17

## 2025-03-27 ENCOUNTER — APPOINTMENT (OUTPATIENT)
Dept: PULMONOLOGY | Facility: CLINIC | Age: 75
End: 2025-03-27
Payer: MEDICARE

## 2025-03-27 VITALS
TEMPERATURE: 97.8 F | HEIGHT: 69 IN | HEART RATE: 86 BPM | WEIGHT: 156 LBS | DIASTOLIC BLOOD PRESSURE: 79 MMHG | BODY MASS INDEX: 23.11 KG/M2 | OXYGEN SATURATION: 95 % | SYSTOLIC BLOOD PRESSURE: 151 MMHG

## 2025-03-27 DIAGNOSIS — A31.0 PULMONARY MYCOBACTERIAL INFECTION: ICD-10-CM

## 2025-03-27 DIAGNOSIS — R04.2 HEMOPTYSIS: ICD-10-CM

## 2025-03-27 DIAGNOSIS — J47.9 BRONCHIECTASIS, UNCOMPLICATED: ICD-10-CM

## 2025-03-27 PROCEDURE — 99215 OFFICE O/P EST HI 40 MIN: CPT

## 2025-03-28 ENCOUNTER — NON-APPOINTMENT (OUTPATIENT)
Age: 75
End: 2025-03-28

## 2025-04-10 ENCOUNTER — APPOINTMENT (OUTPATIENT)
Age: 75
End: 2025-04-10
Payer: MEDICARE

## 2025-04-10 ENCOUNTER — APPOINTMENT (OUTPATIENT)
Age: 75
End: 2025-04-10

## 2025-04-10 ENCOUNTER — NON-APPOINTMENT (OUTPATIENT)
Age: 75
End: 2025-04-10

## 2025-04-10 PROCEDURE — 92133 CPTRZD OPH DX IMG PST SGM ON: CPT

## 2025-04-10 PROCEDURE — 92015 DETERMINE REFRACTIVE STATE: CPT

## 2025-04-10 PROCEDURE — 92012 INTRM OPH EXAM EST PATIENT: CPT

## 2025-04-10 PROCEDURE — 92283 EXTND COLOR VISION XM: CPT

## 2025-04-10 NOTE — ED ADULT NURSE NOTE - BREATH SOUNDS, MLM
Younger sister with similar rash starting earlier this week, possibly consistent with hand foot mouth disease  - Patient's lesions currently on buttocks, mouth, mainly  - On exam, scattered lesions slightly raised, erythematous on buttocks and mouth. No lesions in oral mucosa   - Discussed possible presents of coxsackie like virus    Plan:  - Continue supportive treatments  - Tylenol / Ibuprofen as needed  - Ensure adequate hydration  - Advised of timeline 1-2 weeks for resolution  - Return precautions advised     
Clear

## 2025-04-11 ENCOUNTER — TRANSCRIPTION ENCOUNTER (OUTPATIENT)
Age: 75
End: 2025-04-11

## 2025-04-16 ENCOUNTER — TRANSCRIPTION ENCOUNTER (OUTPATIENT)
Age: 75
End: 2025-04-16

## 2025-04-16 RX ORDER — ETHAMBUTOL HYDROCHLORIDE 400 MG/1
400 TABLET ORAL
Qty: 60 | Refills: 1 | Status: ACTIVE | COMMUNITY
Start: 2025-04-16 | End: 1900-01-01

## 2025-04-16 RX ORDER — AZITHROMYCIN 250 MG/1
250 TABLET, FILM COATED ORAL
Qty: 1 | Refills: 1 | Status: ACTIVE | COMMUNITY
Start: 2025-04-16 | End: 1900-01-01

## 2025-04-16 RX ORDER — RIFAMPIN 300 MG/1
300 CAPSULE ORAL
Qty: 1 | Refills: 1 | Status: ACTIVE | COMMUNITY
Start: 2025-04-16 | End: 1900-01-01

## 2025-04-17 ENCOUNTER — TRANSCRIPTION ENCOUNTER (OUTPATIENT)
Age: 75
End: 2025-04-17

## 2025-04-18 ENCOUNTER — TRANSCRIPTION ENCOUNTER (OUTPATIENT)
Age: 75
End: 2025-04-18

## 2025-04-21 ENCOUNTER — TRANSCRIPTION ENCOUNTER (OUTPATIENT)
Age: 75
End: 2025-04-21

## 2025-04-22 ENCOUNTER — TRANSCRIPTION ENCOUNTER (OUTPATIENT)
Age: 75
End: 2025-04-22

## 2025-04-23 ENCOUNTER — TRANSCRIPTION ENCOUNTER (OUTPATIENT)
Age: 75
End: 2025-04-23

## 2025-04-23 ENCOUNTER — NON-APPOINTMENT (OUTPATIENT)
Age: 75
End: 2025-04-23

## 2025-05-12 ENCOUNTER — TRANSCRIPTION ENCOUNTER (OUTPATIENT)
Age: 75
End: 2025-05-12

## 2025-05-13 ENCOUNTER — TRANSCRIPTION ENCOUNTER (OUTPATIENT)
Age: 75
End: 2025-05-13

## 2025-05-14 ENCOUNTER — TRANSCRIPTION ENCOUNTER (OUTPATIENT)
Age: 75
End: 2025-05-14

## 2025-05-16 ENCOUNTER — TRANSCRIPTION ENCOUNTER (OUTPATIENT)
Age: 75
End: 2025-05-16

## 2025-05-20 ENCOUNTER — APPOINTMENT (OUTPATIENT)
Dept: INFECTIOUS DISEASE | Facility: CLINIC | Age: 75
End: 2025-05-20
Payer: MEDICARE

## 2025-05-20 VITALS
OXYGEN SATURATION: 97 % | BODY MASS INDEX: 22.81 KG/M2 | WEIGHT: 154 LBS | HEART RATE: 81 BPM | TEMPERATURE: 98 F | HEIGHT: 69 IN | DIASTOLIC BLOOD PRESSURE: 72 MMHG | SYSTOLIC BLOOD PRESSURE: 125 MMHG

## 2025-05-20 PROCEDURE — 99213 OFFICE O/P EST LOW 20 MIN: CPT

## 2025-05-20 RX ORDER — CLARITHROMYCIN 500 MG/1
500 TABLET, FILM COATED ORAL
Qty: 60 | Refills: 3 | Status: ACTIVE | COMMUNITY
Start: 2025-05-20 | End: 1900-01-01

## 2025-05-21 ENCOUNTER — NON-APPOINTMENT (OUTPATIENT)
Age: 75
End: 2025-05-21

## 2025-05-21 LAB
ALBUMIN SERPL ELPH-MCNC: 4.7 G/DL
ALP BLD-CCNC: 122 U/L
ALT SERPL-CCNC: 42 U/L
ANION GAP SERPL CALC-SCNC: 12 MMOL/L
AST SERPL-CCNC: 37 U/L
BASOPHILS # BLD AUTO: 0.05 K/UL
BASOPHILS NFR BLD AUTO: 0.6 %
BILIRUB SERPL-MCNC: 0.2 MG/DL
BUN SERPL-MCNC: 14 MG/DL
CALCIUM SERPL-MCNC: 9.5 MG/DL
CHLORIDE SERPL-SCNC: 103 MMOL/L
CO2 SERPL-SCNC: 24 MMOL/L
CREAT SERPL-MCNC: 0.96 MG/DL
EGFRCR SERPLBLD CKD-EPI 2021: 82 ML/MIN/1.73M2
EOSINOPHIL # BLD AUTO: 0.19 K/UL
EOSINOPHIL NFR BLD AUTO: 2.3 %
GLUCOSE SERPL-MCNC: 97 MG/DL
HCT VFR BLD CALC: 46.7 %
HGB BLD-MCNC: 14.7 G/DL
IMM GRANULOCYTES NFR BLD AUTO: 0.2 %
LYMPHOCYTES # BLD AUTO: 2.01 K/UL
LYMPHOCYTES NFR BLD AUTO: 24.8 %
MAN DIFF?: NORMAL
MCHC RBC-ENTMCNC: 29.5 PG
MCHC RBC-ENTMCNC: 31.5 G/DL
MCV RBC AUTO: 93.8 FL
MONOCYTES # BLD AUTO: 0.87 K/UL
MONOCYTES NFR BLD AUTO: 10.8 %
NEUTROPHILS # BLD AUTO: 4.95 K/UL
NEUTROPHILS NFR BLD AUTO: 61.3 %
PLATELET # BLD AUTO: 227 K/UL
POTASSIUM SERPL-SCNC: 5.2 MMOL/L
PROT SERPL-MCNC: 7.7 G/DL
RBC # BLD: 4.98 M/UL
RBC # FLD: 13 %
SODIUM SERPL-SCNC: 139 MMOL/L
WBC # FLD AUTO: 8.09 K/UL

## 2025-05-23 ENCOUNTER — TRANSCRIPTION ENCOUNTER (OUTPATIENT)
Age: 75
End: 2025-05-23

## 2025-05-27 NOTE — ED PROVIDER NOTE - PMH
BPH (benign prostatic hyperplasia)    Carcinoma of nasopharyngeal wall  Dx in 2010, s/o chemo and radio therapy, in remission.  HTN (hypertension)    Hypercholesteremia 7

## 2025-06-02 ENCOUNTER — TRANSCRIPTION ENCOUNTER (OUTPATIENT)
Age: 75
End: 2025-06-02

## 2025-06-03 ENCOUNTER — TRANSCRIPTION ENCOUNTER (OUTPATIENT)
Age: 75
End: 2025-06-03

## 2025-06-04 ENCOUNTER — APPOINTMENT (OUTPATIENT)
Dept: CT IMAGING | Facility: CLINIC | Age: 75
End: 2025-06-04

## 2025-06-04 ENCOUNTER — OUTPATIENT (OUTPATIENT)
Dept: OUTPATIENT SERVICES | Facility: HOSPITAL | Age: 75
LOS: 1 days | End: 2025-06-04
Payer: MEDICARE

## 2025-06-04 DIAGNOSIS — Z86.19 PERSONAL HISTORY OF OTHER INFECTIOUS AND PARASITIC DISEASES: ICD-10-CM

## 2025-06-04 DIAGNOSIS — Z98.890 OTHER SPECIFIED POSTPROCEDURAL STATES: Chronic | ICD-10-CM

## 2025-06-04 PROCEDURE — 71250 CT THORAX DX C-: CPT | Mod: 26

## 2025-06-04 PROCEDURE — 71250 CT THORAX DX C-: CPT

## 2025-06-06 ENCOUNTER — TRANSCRIPTION ENCOUNTER (OUTPATIENT)
Age: 75
End: 2025-06-06

## 2025-06-13 LAB — ACID FAST STN SPT: NORMAL

## 2025-06-17 ENCOUNTER — APPOINTMENT (OUTPATIENT)
Dept: INFECTIOUS DISEASE | Facility: CLINIC | Age: 75
End: 2025-06-17
Payer: MEDICARE

## 2025-06-17 VITALS
TEMPERATURE: 97.9 F | WEIGHT: 155 LBS | HEIGHT: 69 IN | HEART RATE: 94 BPM | OXYGEN SATURATION: 96 % | BODY MASS INDEX: 22.96 KG/M2 | DIASTOLIC BLOOD PRESSURE: 79 MMHG | SYSTOLIC BLOOD PRESSURE: 163 MMHG

## 2025-06-17 PROCEDURE — 99213 OFFICE O/P EST LOW 20 MIN: CPT

## 2025-06-17 PROCEDURE — G2211 COMPLEX E/M VISIT ADD ON: CPT

## 2025-06-20 LAB
ALBUMIN SERPL ELPH-MCNC: 4.7 G/DL
ALP BLD-CCNC: 109 U/L
ALT SERPL-CCNC: 31 U/L
ANION GAP SERPL CALC-SCNC: 11 MMOL/L
AST SERPL-CCNC: 36 U/L
BASOPHILS # BLD AUTO: 0.05 K/UL
BASOPHILS NFR BLD AUTO: 0.6 %
BILIRUB SERPL-MCNC: 0.4 MG/DL
BUN SERPL-MCNC: 17 MG/DL
CALCIUM SERPL-MCNC: 9.4 MG/DL
CHLORIDE SERPL-SCNC: 106 MMOL/L
CO2 SERPL-SCNC: 25 MMOL/L
CREAT SERPL-MCNC: 0.93 MG/DL
EGFRCR SERPLBLD CKD-EPI 2021: 86 ML/MIN/1.73M2
EOSINOPHIL # BLD AUTO: 0.1 K/UL
EOSINOPHIL NFR BLD AUTO: 1.3 %
GLUCOSE SERPL-MCNC: 109 MG/DL
HCT VFR BLD CALC: 45.5 %
HGB BLD-MCNC: 14.7 G/DL
IMM GRANULOCYTES NFR BLD AUTO: 0.3 %
LYMPHOCYTES # BLD AUTO: 1.69 K/UL
LYMPHOCYTES NFR BLD AUTO: 21.6 %
MAN DIFF?: NORMAL
MCHC RBC-ENTMCNC: 29.5 PG
MCHC RBC-ENTMCNC: 32.3 G/DL
MCV RBC AUTO: 91.4 FL
MONOCYTES # BLD AUTO: 0.85 K/UL
MONOCYTES NFR BLD AUTO: 10.8 %
NEUTROPHILS # BLD AUTO: 5.13 K/UL
NEUTROPHILS NFR BLD AUTO: 65.4 %
PLATELET # BLD AUTO: 226 K/UL
POTASSIUM SERPL-SCNC: 5.4 MMOL/L
PROT SERPL-MCNC: 7.4 G/DL
RBC # BLD: 4.98 M/UL
RBC # FLD: 13.3 %
SODIUM SERPL-SCNC: 141 MMOL/L
WBC # FLD AUTO: 7.84 K/UL

## 2025-06-23 ENCOUNTER — TRANSCRIPTION ENCOUNTER (OUTPATIENT)
Age: 75
End: 2025-06-23

## 2025-06-24 ENCOUNTER — TRANSCRIPTION ENCOUNTER (OUTPATIENT)
Age: 75
End: 2025-06-24

## 2025-07-07 ENCOUNTER — TRANSCRIPTION ENCOUNTER (OUTPATIENT)
Age: 75
End: 2025-07-07

## 2025-07-22 NOTE — PRE-ANESTHESIA EVALUATION ADULT - NSANTHINDVINFOSD_GEN_ALL_CORE
PA for diclofenac sent to plan per covermymeds.   Awaiting determination.     (Key: [id number])  
patient

## 2025-08-01 ENCOUNTER — APPOINTMENT (OUTPATIENT)
Dept: INFECTIOUS DISEASE | Facility: CLINIC | Age: 75
End: 2025-08-01
Payer: MEDICARE

## 2025-08-01 VITALS
BODY MASS INDEX: 23.25 KG/M2 | HEIGHT: 69 IN | SYSTOLIC BLOOD PRESSURE: 119 MMHG | TEMPERATURE: 97.8 F | WEIGHT: 157 LBS | HEART RATE: 77 BPM | DIASTOLIC BLOOD PRESSURE: 74 MMHG | OXYGEN SATURATION: 95 %

## 2025-08-01 PROCEDURE — G2211 COMPLEX E/M VISIT ADD ON: CPT

## 2025-08-01 PROCEDURE — 99213 OFFICE O/P EST LOW 20 MIN: CPT

## 2025-08-26 ENCOUNTER — TRANSCRIPTION ENCOUNTER (OUTPATIENT)
Age: 75
End: 2025-08-26

## 2025-08-27 ENCOUNTER — TRANSCRIPTION ENCOUNTER (OUTPATIENT)
Age: 75
End: 2025-08-27

## (undated) DEVICE — ELCTR PLASMA LOOP MEDIUM ANGLED 24FR 12-30 DEG

## (undated) DEVICE — BAG URINE W METER 2L

## (undated) DEVICE — GLV 7.5 PROTEXIS (WHITE)

## (undated) DEVICE — ACMI SELF-SEALING SEAL UP TO 7FR

## (undated) DEVICE — SOL IRR POUR H2O 1500ML

## (undated) DEVICE — VENODYNE/SCD SLEEVE CALF LARGE

## (undated) DEVICE — FOLEY CATH 3-WAY 20FR 30CC LATEX LUBRICATH

## (undated) DEVICE — GOWN TRIMAX LG

## (undated) DEVICE — WARMING BLANKET UPPER ADULT

## (undated) DEVICE — POSITIONER FOAM EGG CRATE ULNAR 2PCS (PINK)

## (undated) DEVICE — GLV 7 PROTEXIS (WHITE)

## (undated) DEVICE — GLV 6.5 PROTEXIS (WHITE)

## (undated) DEVICE — FOLEY CATH 3-WAY 20FR 5CC LATEX WHISTLE TIP

## (undated) DEVICE — GLV 8 PROTEXIS (WHITE)

## (undated) DEVICE — PACK CYSTO

## (undated) DEVICE — SOL IRR BAG NS 0.9% 3000ML

## (undated) DEVICE — ELCTR CUTTING LOOP 24FR 12 DEG 0.35 WIRE

## (undated) DEVICE — TUBING TUR 2 PRONG

## (undated) DEVICE — FOLEY HOLDER STATLOCK 2 WAY ADULT

## (undated) DEVICE — MEDICATION LABELS W MARKER

## (undated) DEVICE — ELCTR PLASMA BUTTON OVAL 24FR 12-30 DEG